# Patient Record
Sex: FEMALE | Race: WHITE | NOT HISPANIC OR LATINO | Employment: FULL TIME | URBAN - METROPOLITAN AREA
[De-identification: names, ages, dates, MRNs, and addresses within clinical notes are randomized per-mention and may not be internally consistent; named-entity substitution may affect disease eponyms.]

---

## 2017-02-06 RX ORDER — NORETHINDRONE ACETATE AND ETHINYL ESTRADIOL 1MG-20(21)
1 KIT ORAL DAILY
Qty: 28 TABLET | Refills: 5 | Status: SHIPPED | OUTPATIENT
Start: 2017-02-06 | End: 2017-02-07 | Stop reason: SDUPTHER

## 2017-02-07 RX ORDER — NORETHINDRONE ACETATE AND ETHINYL ESTRADIOL 1MG-20(21)
1 KIT ORAL DAILY
Qty: 28 TABLET | Refills: 5 | Status: SHIPPED | OUTPATIENT
Start: 2017-02-07 | End: 2017-07-04 | Stop reason: SDUPTHER

## 2017-07-05 RX ORDER — NORETHINDRONE ACETATE AND ETHINYL ESTRADIOL AND FERROUS FUMARATE 1MG-20(21)
KIT ORAL
Qty: 28 TABLET | Refills: 2 | Status: SHIPPED | OUTPATIENT
Start: 2017-07-05 | End: 2017-08-17

## 2017-08-14 ENCOUNTER — OFFICE VISIT (OUTPATIENT)
Dept: INTERNAL MEDICINE | Facility: CLINIC | Age: 22
End: 2017-08-14

## 2017-08-14 VITALS
BODY MASS INDEX: 20.16 KG/M2 | SYSTOLIC BLOOD PRESSURE: 124 MMHG | DIASTOLIC BLOOD PRESSURE: 68 MMHG | OXYGEN SATURATION: 97 % | HEART RATE: 63 BPM | TEMPERATURE: 97.4 F | RESPIRATION RATE: 16 BRPM | WEIGHT: 121 LBS | HEIGHT: 65 IN

## 2017-08-14 DIAGNOSIS — Z00.00 ENCOUNTER FOR ANNUAL PHYSICAL EXAM: Primary | ICD-10-CM

## 2017-08-14 LAB
BILIRUB BLD-MCNC: NEGATIVE MG/DL
CLARITY, POC: CLEAR
COLOR UR: YELLOW
GLUCOSE UR STRIP-MCNC: NEGATIVE MG/DL
KETONES UR QL: NEGATIVE
LEUKOCYTE EST, POC: ABNORMAL
NITRITE UR-MCNC: NEGATIVE MG/ML
PH UR: 6 [PH] (ref 5–8)
PROT UR STRIP-MCNC: ABNORMAL MG/DL
RBC # UR STRIP: NEGATIVE /UL
SP GR UR: 1.02 (ref 1–1.03)
UROBILINOGEN UR QL: NORMAL

## 2017-08-14 PROCEDURE — 99395 PREV VISIT EST AGE 18-39: CPT | Performed by: INTERNAL MEDICINE

## 2017-08-14 PROCEDURE — 81003 URINALYSIS AUTO W/O SCOPE: CPT | Performed by: INTERNAL MEDICINE

## 2017-08-14 NOTE — PROGRESS NOTES
Subjective   Serina Rodriguez is a 22 y.o. female.     Chief Complaint   Patient presents with   • Annual Exam     physical         HPI Comments: In for annual preventative exam.  Sleep is good.  Averages about 8 hours at night.  Energy is good.  Exercises daily.  Diet is well-balanced.       The following portions of the patient's history were reviewed and updated as appropriate: allergies, current medications, past social history and problem list.    HISTORY  Outpatient Prescriptions Marked as Taking for the 8/14/17 encounter (Office Visit) with Gabino Buenrostro MD   Medication Sig Dispense Refill   • folic acid (FOLVITE) 1 MG tablet Take 1 mg by mouth daily.     • JUNEL FE 1/20 1-20 MG-MCG per tablet TAKE 1 TABLET BY MOUTH DAILY. 28 tablet 2   • levETIRAcetam (KEPPRA) 750 MG tablet Take 750 mg by mouth 2 (two) times a day.       Social History     Social History   • Marital status: Single     Spouse name: N/A   • Number of children: N/A   • Years of education: N/A     Occupational History   • Not on file.     Social History Main Topics   • Smoking status: Never Smoker   • Smokeless tobacco: Not on file   • Alcohol use No   • Drug use: Not on file   • Sexual activity: Not on file     Other Topics Concern   • Not on file     Social History Narrative     Family History   Problem Relation Age of Onset   • No Known Problems Mother    • No Known Problems Father    • No Known Problems Sister    • Breast cancer Maternal Grandmother    • Lung cancer Maternal Grandmother    • Cervical cancer Maternal Aunt      Past Medical History:   Diagnosis Date   • Asthma    • IBS (irritable bowel syndrome)    • Iron deficiency anemia    • Irregular menses    • Medication management    • Migraine    • Physical exam, annual    • Seizure, partial    • Syncope, vasovagal      No past surgical history on file.    Review of Systems   Neurological: Positive for headaches.       Objective   Vitals:    08/14/17 0830   BP: 124/68   Pulse: 63    Resp: 16   Temp: 97.4 °F (36.3 °C)   SpO2: 97%      Last Weight    08/14/17  0830   Weight: 121 lb (54.9 kg)    [unfilled]  Body mass index is 20.14 kg/(m^2).      Physical Exam   Constitutional: She is oriented to person, place, and time. She appears well-developed and well-nourished.   HENT:   Head: Normocephalic and atraumatic.   Right Ear: External ear normal.   Left Ear: External ear normal.   Nose: Nose normal.   Mouth/Throat: Oropharynx is clear and moist.   Eyes: Conjunctivae and EOM are normal. Pupils are equal, round, and reactive to light.   Neck: Normal range of motion. Neck supple. No JVD present. No thyromegaly present.   Cardiovascular: Normal rate, regular rhythm, normal heart sounds and intact distal pulses.  Exam reveals no gallop.    No murmur heard.  Pulmonary/Chest: Effort normal and breath sounds normal. No respiratory distress. She has no wheezes. She has no rales.   Abdominal: Soft. Bowel sounds are normal. She exhibits no distension and no mass. There is no tenderness. There is no guarding. No hernia.   Musculoskeletal: Normal range of motion. She exhibits no edema.   Lymphadenopathy:     She has no cervical adenopathy.   Neurological: She is alert and oriented to person, place, and time. She displays normal reflexes. No cranial nerve deficit. Coordination normal.   Skin: Skin is warm and dry.   Psychiatric: She has a normal mood and affect. Her behavior is normal. Judgment and thought content normal.   Nursing note and vitals reviewed.        Problem List Items Addressed This Visit     None      Visit Diagnoses     Encounter for annual physical exam    -  Primary    Relevant Orders    POCT urinalysis dipstick, automated (Completed)        Assessment/Plan   In for annual preventative exam.  Done with college.  Working.  Doing IT consulting.  IBS in high school.  Not much of a problem anymore at all.  She's had a history of migraines but she rarely gets them.  She had one spell of  asthma 7 years ago, probably asthmatic bronchitis.  No further problems there.  She due for lab work today including CBC, CMP, lipids, UA.  We need to get her immunization records, especially in regards to tetanus immunization.  He is almost certainly up-to-date.         Dragon disclaimer:   Much of this encounter note is an electronic transcription/translation of spoken language to printed text. The electronic translation of spoken language may permit erroneous, or at times, nonsensical words or phrases to be inadvertently transcribed; Although I have reviewed the note for such errors, some may still exist.

## 2017-08-15 LAB
ALBUMIN SERPL-MCNC: 4.6 G/DL (ref 3.5–5.2)
ALBUMIN/GLOB SERPL: 1.8 G/DL
ALP SERPL-CCNC: 66 U/L (ref 39–117)
ALT SERPL-CCNC: 11 U/L (ref 1–33)
AST SERPL-CCNC: 16 U/L (ref 1–32)
BASOPHILS # BLD AUTO: 0.02 10*3/MM3 (ref 0–0.2)
BASOPHILS NFR BLD AUTO: 0.3 % (ref 0–1.5)
BILIRUB SERPL-MCNC: 0.6 MG/DL (ref 0.1–1.2)
BUN SERPL-MCNC: 14 MG/DL (ref 6–20)
BUN/CREAT SERPL: 17.9 (ref 7–25)
CALCIUM SERPL-MCNC: 10 MG/DL (ref 8.6–10.5)
CHLORIDE SERPL-SCNC: 100 MMOL/L (ref 98–107)
CHOLEST SERPL-MCNC: 227 MG/DL (ref 0–200)
CO2 SERPL-SCNC: 26.7 MMOL/L (ref 22–29)
CREAT SERPL-MCNC: 0.78 MG/DL (ref 0.57–1)
EOSINOPHIL # BLD AUTO: 0.29 10*3/MM3 (ref 0–0.7)
EOSINOPHIL NFR BLD AUTO: 4.9 % (ref 0.3–6.2)
ERYTHROCYTE [DISTWIDTH] IN BLOOD BY AUTOMATED COUNT: 12.9 % (ref 11.7–13)
GLOBULIN SER CALC-MCNC: 2.5 GM/DL
GLUCOSE SERPL-MCNC: 82 MG/DL (ref 65–99)
HCT VFR BLD AUTO: 46.9 % (ref 35.6–45.5)
HDLC SERPL-MCNC: 71 MG/DL (ref 40–60)
HGB BLD-MCNC: 14.7 G/DL (ref 11.9–15.5)
IMM GRANULOCYTES # BLD: 0 10*3/MM3 (ref 0–0.03)
IMM GRANULOCYTES NFR BLD: 0 % (ref 0–0.5)
LDLC SERPL CALC-MCNC: 137 MG/DL (ref 0–100)
LYMPHOCYTES # BLD AUTO: 1.89 10*3/MM3 (ref 0.9–4.8)
LYMPHOCYTES NFR BLD AUTO: 32.2 % (ref 19.6–45.3)
MCH RBC QN AUTO: 31 PG (ref 26.9–32)
MCHC RBC AUTO-ENTMCNC: 31.3 G/DL (ref 32.4–36.3)
MCV RBC AUTO: 98.9 FL (ref 80.5–98.2)
MONOCYTES # BLD AUTO: 0.5 10*3/MM3 (ref 0.2–1.2)
MONOCYTES NFR BLD AUTO: 8.5 % (ref 5–12)
NEUTROPHILS # BLD AUTO: 3.17 10*3/MM3 (ref 1.9–8.1)
NEUTROPHILS NFR BLD AUTO: 54.1 % (ref 42.7–76)
PLATELET # BLD AUTO: 216 10*3/MM3 (ref 140–500)
POTASSIUM SERPL-SCNC: 4.2 MMOL/L (ref 3.5–5.2)
PROT SERPL-MCNC: 7.1 G/DL (ref 6–8.5)
RBC # BLD AUTO: 4.74 10*6/MM3 (ref 3.9–5.2)
SODIUM SERPL-SCNC: 141 MMOL/L (ref 136–145)
TRIGL SERPL-MCNC: 97 MG/DL (ref 0–150)
VLDLC SERPL CALC-MCNC: 19.4 MG/DL (ref 5–40)
WBC # BLD AUTO: 5.87 10*3/MM3 (ref 4.5–10.7)

## 2017-08-17 ENCOUNTER — OFFICE VISIT (OUTPATIENT)
Dept: OBSTETRICS AND GYNECOLOGY | Age: 22
End: 2017-08-17

## 2017-08-17 VITALS
WEIGHT: 119 LBS | DIASTOLIC BLOOD PRESSURE: 74 MMHG | BODY MASS INDEX: 19.83 KG/M2 | SYSTOLIC BLOOD PRESSURE: 116 MMHG | HEIGHT: 65 IN

## 2017-08-17 DIAGNOSIS — Z12.4 ROUTINE CERVICAL SMEAR: ICD-10-CM

## 2017-08-17 DIAGNOSIS — Z11.3 SCREEN FOR SEXUALLY TRANSMITTED DISEASES: ICD-10-CM

## 2017-08-17 DIAGNOSIS — Z01.419 ENCOUNTER FOR GYNECOLOGICAL EXAMINATION WITHOUT ABNORMAL FINDING: Primary | ICD-10-CM

## 2017-08-17 PROCEDURE — 99395 PREV VISIT EST AGE 18-39: CPT | Performed by: OBSTETRICS & GYNECOLOGY

## 2017-08-17 RX ORDER — NORETHINDRONE ACETATE AND ETHINYL ESTRADIOL 1MG-20(21)
1 KIT ORAL DAILY
Qty: 28 TABLET | Refills: 12 | Status: SHIPPED | OUTPATIENT
Start: 2017-08-17 | End: 2018-06-30 | Stop reason: SDUPTHER

## 2017-08-17 RX ORDER — NORETHINDRONE ACETATE AND ETHINYL ESTRADIOL 1MG-20(21)
1 KIT ORAL DAILY
Qty: 28 TABLET | Refills: 11 | Status: SHIPPED | OUTPATIENT
Start: 2017-08-17 | End: 2017-08-17 | Stop reason: ALTCHOICE

## 2017-08-17 RX ORDER — NORETHINDRONE ACETATE AND ETHINYL ESTRADIOL 1MG-20(21)
1 KIT ORAL DAILY
COMMUNITY
End: 2017-08-17 | Stop reason: SDUPTHER

## 2017-08-17 NOTE — PROGRESS NOTES
Subjective     Chief Complaint   Patient presents with   • Gynecologic Exam     AC, was Dr Rangel pt       History of Present Illness    Serina Rodriguez is a 22 y.o.  who presents for annual exam. The patient has previously seen Dr. Rangel.    The patient attended [employer] and then went on to the [employer] to study economics.  She is now working here in [place].  She has a history of irregular cycles that have corrected with oral contraceptive pills.  She is not sexually active.  She does have a history of Seizure Disorder for which she Is on Keppra.      Does have a family history of breast and colon cancer.  She will require early screening but her family members have been checked for genetic mutations and have been negative.  Her menses are regular every 28-30 days, lasting 4-7 days, dysmenorrhea none   Obstetric History:  OB History      Para Term  AB TAB SAB Ectopic Multiple Living    0 0 0 0 0 0 0 0 0 0         Menstrual History:     Patient's last menstrual period was 2017.         Current contraception: abstinence  History of abnormal Pap smear: no  Received Gardasil immunization: all 3  Perform regular self breast exam: no  Family history of uterine or ovarian cancer: no  Family History of colon cancer: pat uncle 45, he is Spencer neg  Family history of breast cancer: yes - MGM 48  pt's mom is BRCA neg    Mammogram: not indicated. Start at 38  Colonoscopy: not indicated. Start at 35  DEXA: not indicated.    Exercise: very active Runner  Calcium/Vitamin D: adequate intake    The following portions of the patient's history were reviewed and updated as appropriate: allergies, current medications, past family history, past medical history, past social history, past surgical history and problem list.    Review of Systems    Review of Systems   Constitutional: Negative for fatigue.   Respiratory: Negative for shortness of breath.    Gastrointestinal:  "Negative for abdominal pain.   Genitourinary: Negative for dysuria.   Neurological: Negative for headaches.   Psychiatric/Behavioral: Negative for dysphoric mood.         Objective   Physical Exam    /74  Ht 65\" (165.1 cm)  Wt 119 lb (54 kg)  LMP 08/07/2017  BMI 19.8 kg/m2    General:   alert, appears stated age and cooperative   Neck: thyroid normal to palpation   Heart: regular rate and rhythm   Lungs: clear to auscultation bilaterally   Abdomen: soft, non-tender, without masses or organomegaly   Breast: inspection negative, no nipple discharge or bleeding, no masses or nodularity palpable   Vulva: normal, Bartholin's, Urethra, Butte's normal   Vagina: normal mucosa, normal discharge   Cervix: no cervical motion tenderness and no lesions   Uterus: non-tender, normal shape and consistency   Adnexa: no mass, fullness, tenderness   Rectal: not indicated     Assessment/Plan   Serina was seen today for gynecologic exam.    Diagnoses and all orders for this visit:    Encounter for gynecological examination without abnormal finding    Other orders  -     norethindrone-ethinyl estradiol FE (BLISOVI FE 1/20) 1-20 MG-MCG per tablet; Take 1 tablet by mouth Daily.    We did discuss that oral contraceptive pills can be affected by her Keppra.  She is not currently sexually active.  If she becomes sexually active and would recommend the addition of condoms or consideration of an IUD.    All questions answered.  Breast self exam technique reviewed and patient encouraged to perform self-exam monthly.  Discussed healthy lifestyle modifications.  Recommended 30 minutes of aerobic exercise five times per week.  Discussed calcium needs to prevent osteoporosis.                 "

## 2017-08-22 ENCOUNTER — TELEPHONE (OUTPATIENT)
Dept: OBSTETRICS AND GYNECOLOGY | Age: 22
End: 2017-08-22

## 2017-08-22 LAB
C TRACH RRNA CVX QL NAA+PROBE: NEGATIVE
CONV .: NORMAL
CYTOLOGIST CVX/VAG CYTO: NORMAL
CYTOLOGY CVX/VAG DOC THIN PREP: NORMAL
DX ICD CODE: NORMAL
HIV 1 & 2 AB SER-IMP: NORMAL
N GONORRHOEA RRNA CVX QL NAA+PROBE: NEGATIVE
OTHER STN SPEC: NORMAL
PATH REPORT.FINAL DX SPEC: NORMAL
STAT OF ADQ CVX/VAG CYTO-IMP: NORMAL

## 2018-07-02 RX ORDER — NORETHINDRONE ACETATE AND ETHINYL ESTRADIOL AND FERROUS FUMARATE 1MG-20(21)
KIT ORAL
Qty: 28 TABLET | Refills: 0 | Status: SHIPPED | OUTPATIENT
Start: 2018-07-02 | End: 2018-07-23 | Stop reason: SDUPTHER

## 2018-07-23 RX ORDER — NORETHINDRONE ACETATE AND ETHINYL ESTRADIOL AND FERROUS FUMARATE 1MG-20(21)
KIT ORAL
Qty: 28 TABLET | Refills: 0 | Status: SHIPPED | OUTPATIENT
Start: 2018-07-23 | End: 2019-10-01 | Stop reason: SDUPTHER

## 2018-08-27 RX ORDER — NORETHINDRONE ACETATE AND ETHINYL ESTRADIOL AND FERROUS FUMARATE 1MG-20(21)
KIT ORAL
Qty: 28 TABLET | Refills: 0 | OUTPATIENT
Start: 2018-08-27

## 2018-08-29 ENCOUNTER — TELEPHONE (OUTPATIENT)
Dept: OBSTETRICS AND GYNECOLOGY | Age: 23
End: 2018-08-29

## 2018-08-29 RX ORDER — NORETHINDRONE ACETATE AND ETHINYL ESTRADIOL 1MG-20(21)
1 KIT ORAL DAILY
Qty: 28 TABLET | Refills: 12 | Status: SHIPPED | OUTPATIENT
Start: 2018-08-29 | End: 2019-08-29

## 2018-08-29 NOTE — TELEPHONE ENCOUNTER
Patient is requesting 1 refill of her Birth control. [place] an exam down there but will need 1 week worth of pills.

## 2019-10-01 ENCOUNTER — OFFICE VISIT (OUTPATIENT)
Dept: OBSTETRICS AND GYNECOLOGY | Age: 24
End: 2019-10-01

## 2019-10-01 VITALS
SYSTOLIC BLOOD PRESSURE: 110 MMHG | BODY MASS INDEX: 20.32 KG/M2 | DIASTOLIC BLOOD PRESSURE: 64 MMHG | HEIGHT: 64 IN | WEIGHT: 119 LBS

## 2019-10-01 DIAGNOSIS — Z01.419 WELL WOMAN EXAM WITH ROUTINE GYNECOLOGICAL EXAM: Primary | ICD-10-CM

## 2019-10-01 PROCEDURE — 99395 PREV VISIT EST AGE 18-39: CPT | Performed by: PHYSICIAN ASSISTANT

## 2019-10-01 RX ORDER — NORETHINDRONE ACETATE AND ETHINYL ESTRADIOL 1MG-20(21)
1 KIT ORAL DAILY
Qty: 28 TABLET | Refills: 11 | Status: SHIPPED | OUTPATIENT
Start: 2019-10-01 | End: 2020-10-06 | Stop reason: SDUPTHER

## 2020-10-06 ENCOUNTER — OFFICE VISIT (OUTPATIENT)
Dept: OBSTETRICS AND GYNECOLOGY | Age: 25
End: 2020-10-06

## 2020-10-06 VITALS
DIASTOLIC BLOOD PRESSURE: 64 MMHG | SYSTOLIC BLOOD PRESSURE: 104 MMHG | HEIGHT: 65 IN | WEIGHT: 121 LBS | BODY MASS INDEX: 20.16 KG/M2

## 2020-10-06 DIAGNOSIS — Z01.419 WELL WOMAN EXAM WITH ROUTINE GYNECOLOGICAL EXAM: Primary | ICD-10-CM

## 2020-10-06 PROCEDURE — 99395 PREV VISIT EST AGE 18-39: CPT | Performed by: PHYSICIAN ASSISTANT

## 2020-10-06 RX ORDER — NORETHINDRONE ACETATE AND ETHINYL ESTRADIOL 1MG-20(21)
1 KIT ORAL DAILY
Qty: 90 TABLET | Refills: 3 | Status: SHIPPED | OUTPATIENT
Start: 2020-10-06 | End: 2020-10-20

## 2020-10-06 NOTE — PROGRESS NOTES
Subjective     Chief Complaint   Patient presents with   • Gynecologic Exam     pap 2017 neg, no hpv       History of Present Illness    Serina Rodriguez is a 25 y.o.  who presents for annual exam.    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         Current contraception: OCP (estrogen/progesterone)  History of abnormal Pap smear: no  Received Gardasil immunization: yes  Perform regular self breast exam: yes - occl  Family history of uterine or ovarian cancer: no  Family History of colon cancer: no  Family history of breast cancer: yes - MGM     Mammogram: not indicated.  Colonoscopy: not indicated.  DEXA: not indicated.    Exercise: moderately active  Calcium/Vitamin D: adequate intake    The following portions of the patient's history were reviewed and updated as appropriate: allergies, current medications, past family history, past medical history, past social history, past surgical history and problem list.    Review of Systems    Review of Systems   Constitutional: Negative for fatigue.   Respiratory: Negative for shortness of breath.    Gastrointestinal: Negative for abdominal pain.   Genitourinary: Negative for dysuria.   Neurological: Negative for headaches.   Psychiatric/Behavioral: Negative for dysphoric mood.         Objective   Physical  "Exam    /64   Ht 165.1 cm (65\")   Wt 54.9 kg (121 lb)   LMP 09/28/2020 (Exact Date)   Breastfeeding No   BMI 20.14 kg/m²   General:   alert, comfortable and no distress   Heart: regular rate and rhythm   Lungs: clear to auscultation bilaterally   Breast: normal appearance, no masses or tenderness, Inspection negative, No nipple retraction or dimpling, No nipple discharge or bleeding, No axillary or supraclavicular adenopathy, Normal to palpation without dominant masses   Neck: no adenopathy and no carotid bruit   Abdomen: {normal findings: bowel sounds normal   CVA: Not performed today   Pelvis: External genitalia: normal general appearance  Vaginal: normal mucosa without prolapse or lesions  Cervix: normal appearance and thin prep PAP obtained  Adnexa: normal bimanual exam  Uterus: normal single, nontender   Extremities: Not performed today   Neurologic: negative   Psychiatric: Normal affect, judgement, and mood     Assessment/Plan   Serina was seen today for gynecologic exam.    Diagnoses and all orders for this visit:    Well woman exam with routine gynecological exam  -     Pap IG, Rfx HPV ASCU    Other orders  -     norethindrone-ethinyl estradiol FE (Junel FE 1/20) 1-20 MG-MCG per tablet; Take 1 tablet by mouth Daily.        All questions answered.  Breast self exam technique reviewed and patient encouraged to perform self-exam monthly.  Discussed healthy lifestyle modifications.  Recommended 30 minutes of aerobic exercise five times per week.  Discussed calcium needs to prevent osteoporosis.    Pap done today, declines std testing  Refill sent in for BCP  Will wait approx 3 years prior to pregnancy               "

## 2020-10-14 LAB
CONV .: NORMAL
CYTOLOGIST CVX/VAG CYTO: NORMAL
CYTOLOGY CVX/VAG DOC CYTO: NORMAL
CYTOLOGY CVX/VAG DOC THIN PREP: NORMAL
DX ICD CODE: NORMAL
HIV 1 & 2 AB SER-IMP: NORMAL
OTHER STN SPEC: NORMAL
PATHOLOGIST CVX/VAG CYTO: NORMAL
STAT OF ADQ CVX/VAG CYTO-IMP: NORMAL

## 2020-10-20 RX ORDER — NORETHINDRONE ACETATE AND ETHINYL ESTRADIOL AND FERROUS FUMARATE 1MG-20(21)
KIT ORAL
Qty: 90 TABLET | Refills: 3 | Status: SHIPPED | OUTPATIENT
Start: 2020-10-20 | End: 2021-09-20

## 2020-12-22 ENCOUNTER — TELEMEDICINE (OUTPATIENT)
Dept: INTERNAL MEDICINE | Facility: CLINIC | Age: 25
End: 2020-12-22

## 2020-12-22 DIAGNOSIS — M19.90 ARTHRITIS: Primary | ICD-10-CM

## 2020-12-22 PROCEDURE — 99213 OFFICE O/P EST LOW 20 MIN: CPT | Performed by: INTERNAL MEDICINE

## 2020-12-22 NOTE — PROGRESS NOTES
Subjective   Serina Rodriguez is a 25 y.o. female.     Chief Complaint   Patient presents with   • Joint Swelling     Knuckle edema x 10 days   • Hand Pain         Joint Swelling  This is a new problem. The current episode started 1 to 4 weeks ago. The problem occurs constantly. The problem has been unchanged. Associated symptoms include joint swelling. Pertinent negatives include no arthralgias, chills, fever, rash or urinary symptoms. Nothing aggravates the symptoms.        The following portions of the patient's history were reviewed and updated as appropriate: allergies, current medications, past social history and problem list.    Outpatient Medications Marked as Taking for the 12/22/20 encounter (Telemedicine) with Gabino Buenrostro MD   Medication Sig Dispense Refill   • folic acid (FOLVITE) 1 MG tablet Take 1 mg by mouth daily.     • Junel FE 1/20 1-20 MG-MCG per tablet TAKE 1 TABLET BY MOUTH DAILY 90 tablet 3   • levETIRAcetam (KEPPRA) 750 MG tablet Take 750 mg by mouth 2 (two) times a day.         Review of Systems   Constitutional: Negative for chills and fever.   Musculoskeletal: Positive for joint swelling. Negative for arthralgias.   Skin: Negative for rash.       Objective   There were no vitals filed for this visit.   Wt Readings from Last 3 Encounters:   10/06/20 54.9 kg (121 lb)   10/01/19 54 kg (119 lb)   08/17/17 54 kg (119 lb)    There is no height or weight on file to calculate BMI.      Physical Exam  Constitutional:       Appearance: Normal appearance.   Pulmonary:      Effort: Pulmonary effort is normal.   Musculoskeletal:         General: Swelling present.   Neurological:      Mental Status: She is alert.   Psychiatric:         Mood and Affect: Mood normal.         Behavior: Behavior normal.         Thought Content: Thought content normal.         Judgment: Judgment normal.           Problems Addressed this Visit     None      Visit Diagnoses     Arthritis    -  Primary      Diagnoses        Codes Comments    Arthritis    -  Primary ICD-10-CM: M19.90  ICD-9-CM: 716.90         Assessment/Plan   Video visit today due to Covid pandemic.  She has had some swelling of the joints in her fingers for 13 days.  That includes the right middle finger predominantly but later the right index finger and right little finger.  Also the left ring finger is now involved.  It is all the PIPs.  Each of those PIPs is swollen and this is visible on the video camera today.  She has no pain.  No other joint involvement.  No pleurisy or pericarditis or rash or photosensitivity.  No Raynaud's.  No hair loss.  The etiology of this synovitis is not clear.  It is pauciarticular at this point.  We will simply observe for now.  Try to give it 2 months total.  Neck step will be some connective tissue screening tests.  She just had some hand x-rays at her chiropractor's office this week which were apparently lower normal.  No camping.  No travel.  We will keep in mind parvovirus testing and Lyme testing and so forth is a second order set of test.             Dragon disclaimer:   Much of this encounter note is an electronic transcription/translation of spoken language to printed text. The electronic translation of spoken language may permit erroneous, or at times, nonsensical words or phrases to be inadvertently transcribed; Although I have reviewed the note for such errors, some may still exist.

## 2021-09-20 RX ORDER — NORETHINDRONE ACETATE AND ETHINYL ESTRADIOL AND FERROUS FUMARATE 1MG-20(21)
KIT ORAL
Qty: 84 TABLET | Refills: 0 | Status: SHIPPED | OUTPATIENT
Start: 2021-09-20 | End: 2022-01-06

## 2021-10-11 ENCOUNTER — OFFICE VISIT (OUTPATIENT)
Dept: OBSTETRICS AND GYNECOLOGY | Age: 26
End: 2021-10-11

## 2021-10-11 VITALS
SYSTOLIC BLOOD PRESSURE: 106 MMHG | BODY MASS INDEX: 20.73 KG/M2 | HEIGHT: 65 IN | WEIGHT: 124.4 LBS | DIASTOLIC BLOOD PRESSURE: 62 MMHG

## 2021-10-11 DIAGNOSIS — Z01.419 WELL WOMAN EXAM WITH ROUTINE GYNECOLOGICAL EXAM: Primary | ICD-10-CM

## 2021-10-11 DIAGNOSIS — Z31.69 PRE-CONCEPTION COUNSELING: ICD-10-CM

## 2021-10-11 PROCEDURE — 99395 PREV VISIT EST AGE 18-39: CPT | Performed by: PHYSICIAN ASSISTANT

## 2021-10-11 RX ORDER — MULTIPLE VITAMINS W/ MINERALS TAB 9MG-400MCG
1 TAB ORAL DAILY
COMMUNITY
End: 2022-01-06

## 2021-10-11 NOTE — PROGRESS NOTES
"Subjective     Chief Complaint   Patient presents with   • Gynecologic Exam     annual. last pap 10/6/20 (neg). Pt has no complaints        History of Present Illness    Serina Graham is a 26 y.o.  who presents for annual exam.    She is doing well  Did get  last year and is doing well  Plans to change her first name to her middle name which is what she usually goes by, \"Elda\"    She did get to go to [facility]  Bit of an issue with covid testing but worked out ok  Going to [place] for their year anniversary    On BCP now and happy with them  Is thinking children sooner then later  Disc rubella status and carrier testing  She will do rubella today and review carrier testing     utd on paps  Her menses are regular every 28-30 days, lasting 4-7 days, dysmenorrhea none   Obstetric History:  OB History        0    Para   0    Term   0       0    AB   0    Living   0       SAB   0    IAB   0    Ectopic   0    Molar        Multiple   0    Live Births                   Menstrual History:     Patient's last menstrual period was 2021 (approximate).         Current contraception: OCP (estrogen/progesterone)  History of abnormal Pap smear: no  Received Gardasil immunization: yes  Perform regular self breast exam: yes - occl  Family history of uterine or ovarian cancer: no  Family History of colon cancer: no  Family history of breast cancer: no    Mammogram: not indicated.  Colonoscopy: not indicated.  DEXA: not indicated.    Exercise: moderately active  Calcium/Vitamin D: adequate intake    The following portions of the patient's history were reviewed and updated as appropriate: allergies, current medications, past family history, past medical history, past social history, past surgical history and problem list.    Review of Systems   All other systems reviewed and are negative.       Review of Systems   Constitutional: Negative for fatigue.   Respiratory: Negative for shortness of breath. " "   Gastrointestinal: Negative for abdominal pain.   Genitourinary: Negative for dysuria.   Neurological: Negative for headaches.   Psychiatric/Behavioral: Negative for dysphoric mood.         Objective   Physical Exam    /62   Ht 165.1 cm (65\")   Wt 56.4 kg (124 lb 6.4 oz)   LMP 09/27/2021 (Approximate)   Breastfeeding No   BMI 20.70 kg/m²   General:   alert, comfortable and no distress   Heart: regular rate and rhythm   Lungs: clear to auscultation bilaterally   Breast: normal appearance, no masses or tenderness, Inspection negative, No nipple retraction or dimpling, No nipple discharge or bleeding, No axillary or supraclavicular adenopathy, Normal to palpation without dominant masses   Neck: no adenopathy and no carotid bruit   Abdomen: {normal findings: soft, non-tender   CVA: Not performed today   Pelvis: External genitalia: normal general appearance  Vaginal: normal mucosa without prolapse or lesions  Cervix: normal appearance  Adnexa: normal bimanual exam  Uterus: normal single, nontender   Extremities: Not performed today   Neurologic: negative   Psychiatric: Normal affect, judgement, and mood     Assessment/Plan   Diagnoses and all orders for this visit:    1. Well woman exam with routine gynecological exam (Primary)    2. Pre-conception counseling  -     Rubella Antibody, IgG        All questions answered.  Breast self exam technique reviewed and patient encouraged to perform self-exam monthly.  Discussed healthy lifestyle modifications.  Recommended 30 minutes of aerobic exercise five times per week.  Discussed calcium needs to prevent osteoporosis.    Pap utd  Rubella testing today  Disc carrier testing  Start PNV                "

## 2021-10-12 LAB — RUBV IGG SERPL IA-ACNC: 1 INDEX

## 2022-01-06 ENCOUNTER — INITIAL PRENATAL (OUTPATIENT)
Dept: OBSTETRICS AND GYNECOLOGY | Age: 27
End: 2022-01-06

## 2022-01-06 VITALS — DIASTOLIC BLOOD PRESSURE: 76 MMHG | WEIGHT: 129 LBS | SYSTOLIC BLOOD PRESSURE: 108 MMHG | BODY MASS INDEX: 21.47 KG/M2

## 2022-01-06 DIAGNOSIS — O36.80X0 PREGNANCY WITH INCONCLUSIVE FETAL VIABILITY: ICD-10-CM

## 2022-01-06 DIAGNOSIS — Z11.3 SCREENING EXAMINATION FOR VENEREAL DISEASE: ICD-10-CM

## 2022-01-06 DIAGNOSIS — Z13.89 SCREENING FOR BLOOD OR PROTEIN IN URINE: Primary | ICD-10-CM

## 2022-01-06 DIAGNOSIS — Z82.79 FAMILY HISTORY OF CONGENITAL HEART DISEASE IN SISTER: ICD-10-CM

## 2022-01-06 DIAGNOSIS — Z34.00 SUPERVISION OF NORMAL FIRST PREGNANCY, ANTEPARTUM: ICD-10-CM

## 2022-01-06 LAB
GLUCOSE UR STRIP-MCNC: NEGATIVE MG/DL
PROT UR STRIP-MCNC: NEGATIVE MG/DL
VZV IGG SER QL: NORMAL

## 2022-01-06 PROCEDURE — 0501F PRENATAL FLOW SHEET: CPT | Performed by: OBSTETRICS & GYNECOLOGY

## 2022-01-06 PROCEDURE — 76817 TRANSVAGINAL US OBSTETRIC: CPT | Performed by: OBSTETRICS & GYNECOLOGY

## 2022-01-06 NOTE — PROGRESS NOTES
The patient is a 26-year-old  1 para 0 at 10 weeks 1 day by sure LMP consistent within 4 days by ultrasound today.  Patient does have significant history of a seizure disorder however she has not had a seizure since  and she has been off Keppra for many years.  She has seen a neurologist through [facility].  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  Patient works in [employer].  She is here today with her  Franc.  He is from [place].  He himself had clubfeet.  The patient's sister had a VSD and had to have open heart surgery.  Full history including genetic history is reviewed    Exam-see exam tab    Ultrasound shows viable intrauterine pregnancy.  Dates agree within 4 days.    Assessment- 10 weeks  History of seizure disorder with no seizures for the past approximately 6 to 7 years.  Patient has followed with neurology and has been taken off medication.  She does have a history of absence seizure's.  She was previously on Keppra.  Discussed options for genetic testing-patient declines amniocentesis but would like to do cell free DNA and carrier testing.  Discussed the testing in detail  Patient has a history of having seizures after flu vaccinations.  She declines flu and COVID vaccinations because of this reason.  Family history of VSD and her sister that required open heart surgery at age 4.  Plan to check fetal echo.  We discussed new OB information in detail.  Follow-up in 4 weeks.   had club feet.  We will try to look for this on ultrasound.

## 2022-01-08 LAB
ABO GROUP BLD: ABNORMAL
BACTERIA UR CULT: NO GROWTH
BACTERIA UR CULT: NORMAL
BASOPHILS # BLD AUTO: 0 X10E3/UL (ref 0–0.2)
BASOPHILS NFR BLD AUTO: 0 %
BLD GP AB SCN SERPL QL: NEGATIVE
EOSINOPHIL # BLD AUTO: 0.1 X10E3/UL (ref 0–0.4)
EOSINOPHIL NFR BLD AUTO: 1 %
ERYTHROCYTE [DISTWIDTH] IN BLOOD BY AUTOMATED COUNT: 12.6 % (ref 11.7–15.4)
HBV SURFACE AG SERPL QL IA: NEGATIVE
HCT VFR BLD AUTO: 40.5 % (ref 34–46.6)
HCV AB S/CO SERPL IA: 0.1 S/CO RATIO (ref 0–0.9)
HGB BLD-MCNC: 13.9 G/DL (ref 11.1–15.9)
HIV 1+2 AB+HIV1 P24 AG SERPL QL IA: NON REACTIVE
IMM GRANULOCYTES # BLD AUTO: 0 X10E3/UL (ref 0–0.1)
IMM GRANULOCYTES NFR BLD AUTO: 0 %
LYMPHOCYTES # BLD AUTO: 1.7 X10E3/UL (ref 0.7–3.1)
LYMPHOCYTES NFR BLD AUTO: 18 %
MCH RBC QN AUTO: 31.3 PG (ref 26.6–33)
MCHC RBC AUTO-ENTMCNC: 34.3 G/DL (ref 31.5–35.7)
MCV RBC AUTO: 91 FL (ref 79–97)
MONOCYTES # BLD AUTO: 0.8 X10E3/UL (ref 0.1–0.9)
MONOCYTES NFR BLD AUTO: 8 %
NEUTROPHILS # BLD AUTO: 7 X10E3/UL (ref 1.4–7)
NEUTROPHILS NFR BLD AUTO: 73 %
PLATELET # BLD AUTO: 245 X10E3/UL (ref 150–450)
RBC # BLD AUTO: 4.44 X10E6/UL (ref 3.77–5.28)
RH BLD: NEGATIVE
RPR SER QL: NON REACTIVE
RUBV IGG SERPL IA-ACNC: 0.93 INDEX
WBC # BLD AUTO: 9.7 X10E3/UL (ref 3.4–10.8)

## 2022-01-09 PROBLEM — O09.899 RUBELLA NON-IMMUNE STATUS, ANTEPARTUM: Status: ACTIVE | Noted: 2022-01-09

## 2022-01-09 PROBLEM — Z67.91 RH NEGATIVE STATUS DURING PREGNANCY: Status: ACTIVE | Noted: 2022-01-09

## 2022-01-09 PROBLEM — O26.899 RH NEGATIVE STATUS DURING PREGNANCY: Status: ACTIVE | Noted: 2022-01-09

## 2022-01-09 PROBLEM — Z28.39 RUBELLA NON-IMMUNE STATUS, ANTEPARTUM: Status: ACTIVE | Noted: 2022-01-09

## 2022-01-11 LAB
C TRACH RRNA SPEC QL NAA+PROBE: NEGATIVE
N GONORRHOEA RRNA SPEC QL NAA+PROBE: NEGATIVE

## 2022-01-22 PROBLEM — Z14.1 CYSTIC FIBROSIS CARRIER: Status: ACTIVE | Noted: 2022-01-22

## 2022-01-24 ENCOUNTER — ROUTINE PRENATAL (OUTPATIENT)
Dept: OBSTETRICS AND GYNECOLOGY | Age: 27
End: 2022-01-24

## 2022-01-24 VITALS — DIASTOLIC BLOOD PRESSURE: 74 MMHG | WEIGHT: 132 LBS | SYSTOLIC BLOOD PRESSURE: 120 MMHG | BODY MASS INDEX: 21.97 KG/M2

## 2022-01-24 DIAGNOSIS — O28.5 ABNORMAL GENETIC TEST DURING PREGNANCY: Primary | ICD-10-CM

## 2022-01-24 PROCEDURE — 0502F SUBSEQUENT PRENATAL CARE: CPT | Performed by: OBSTETRICS & GYNECOLOGY

## 2022-01-24 NOTE — PROGRESS NOTES
I asked the patient to come to the office today because her cell free DNA shows high risk for trisomy 18.  Chance is 91 and 100.  Patient is not having any vaginal bleeding.  The patient's  is at work and not answering his phone.  Patient is here today with her father.    Assessment-12 weeks 5 days with possible trisomy 18  We discussed trisomy 18.  We discussed that cell free DNA testing is a screening test and definitive testing with amniocentesis or [facility] would need to be done for diagnosis.  Ultrasound was offered here today but patient would like to wait till her  is able to be here  Patient is scheduled with [facility] tomorrow for ultrasound and consultation.  Asked the patient to call back with any questions.  She does have an appointment with me next week.

## 2022-01-25 ENCOUNTER — HOSPITAL ENCOUNTER (OUTPATIENT)
Dept: ULTRASOUND IMAGING | Facility: HOSPITAL | Age: 27
Discharge: HOME | End: 2022-01-25
Admitting: OBSTETRICS & GYNECOLOGY

## 2022-01-25 ENCOUNTER — OFFICE VISIT (OUTPATIENT)
Dept: OBSTETRICS AND GYNECOLOGY | Facility: CLINIC | Age: 27
End: 2022-01-25

## 2022-01-25 VITALS
TEMPERATURE: 99.1 F | BODY MASS INDEX: 22.16 KG/M2 | HEIGHT: 65 IN | SYSTOLIC BLOOD PRESSURE: 130 MMHG | HEART RATE: 75 BPM | DIASTOLIC BLOOD PRESSURE: 72 MMHG | WEIGHT: 133 LBS

## 2022-01-25 DIAGNOSIS — O35.10X0 SUSPECTED CHROMOSOME ANOMALY OF FETUS AFFECTING MANAGEMENT OF MOTHER, ANTEPARTUM, SINGLE OR UNSPECIFIED FETUS: ICD-10-CM

## 2022-01-25 DIAGNOSIS — O09.899 RUBELLA NON-IMMUNE STATUS, ANTEPARTUM: ICD-10-CM

## 2022-01-25 DIAGNOSIS — Z82.79 FAMILY HISTORY OF CONGENITAL HEART DISEASE IN SISTER: ICD-10-CM

## 2022-01-25 DIAGNOSIS — Z67.91 RH NEGATIVE STATUS DURING PREGNANCY IN FIRST TRIMESTER: ICD-10-CM

## 2022-01-25 DIAGNOSIS — Z14.1 CYSTIC FIBROSIS CARRIER: ICD-10-CM

## 2022-01-25 DIAGNOSIS — O28.5 ABNORMAL GENETIC TEST DURING PREGNANCY: ICD-10-CM

## 2022-01-25 DIAGNOSIS — O35.2XX0 HEREDITARY DISEASE IN FAMILY POSSIBLY AFFECTING FETUS, AFFECTING MANAGEMENT OF MOTHER IN PREGNANCY, SINGLE OR UNSPECIFIED FETUS: ICD-10-CM

## 2022-01-25 DIAGNOSIS — O26.891 RH NEGATIVE STATUS DURING PREGNANCY IN FIRST TRIMESTER: ICD-10-CM

## 2022-01-25 DIAGNOSIS — Z28.39 RUBELLA NON-IMMUNE STATUS, ANTEPARTUM: ICD-10-CM

## 2022-01-25 DIAGNOSIS — O28.5 ABNORMAL GENETIC TEST DURING PREGNANCY: Primary | ICD-10-CM

## 2022-01-25 PROCEDURE — 76801 OB US < 14 WKS SINGLE FETUS: CPT | Performed by: OBSTETRICS & GYNECOLOGY

## 2022-01-25 PROCEDURE — 99215 OFFICE O/P EST HI 40 MIN: CPT | Performed by: OBSTETRICS & GYNECOLOGY

## 2022-01-25 PROCEDURE — 76801 OB US < 14 WKS SINGLE FETUS: CPT

## 2022-01-25 PROCEDURE — 99417 PROLNG OP E/M EACH 15 MIN: CPT | Performed by: OBSTETRICS & GYNECOLOGY

## 2022-01-25 NOTE — PROGRESS NOTES
Pt reports that she is doing well and denies vaginal bleeding, cramping, contractions, LOF. Pt spoke with Dr. Gaspar yesterday. No questions at this time. Panorama results on chart for Dr. Noble to review.

## 2022-01-25 NOTE — PROGRESS NOTES
"  Consultation:     Serina Graham is a 26 y.o.  female G 1 P 0 LMP 10-27-21 ROLANDO 22 now at seen in consultation as requested by No ref. provider found secondary to:    1) Abnormal NIPT with increased risk for Trisomy 18 (91%).   2) Family h/o congenital cardiac malformation - patient's sister born with VSD requiring surgical correction at age 4;   and his aunt born with clubbed feet requiring surgical correction  3) CF carrier    Vitals:    22 1400   BP: 130/72   BP Location: Right arm   Patient Position: Sitting   Pulse: 75   Temp: 99.1 °F (37.3 °C)   Weight: 60.3 kg (133 lb)   Height: 165.1 cm (65\")        Pre- Labs:    Hemoglobin   Date Value Ref Range Status   2022 13.9 11.1 - 15.9 g/dL Final     Hematocrit   Date Value Ref Range Status   2022 40.5 34.0 - 46.6 % Final     Platelets   Date Value Ref Range Status   2022 245 150 - 450 x10E3/uL Final     Rubella Antibodies, IgG   Date Value Ref Range Status   2022 0.93 (L) Immune >0.99 index Final     Comment:     A second sample should be collected and tested no less than 2-4 weeks.                                  Non-immune       <0.90                                  Equivocal  0.90 - 0.99                                  Immune           >0.99       Hepatitis B Surface Ag   Date Value Ref Range Status   2022 Negative Negative Final     RPR   Date Value Ref Range Status   2022 Non Reactive Non Reactive Final     ABO Type   Date Value Ref Range Status   2022 B  Final     Rh Factor   Date Value Ref Range Status   2022 Negative  Final     Comment:     Please note: Prior records for this patient's ABO / Rh type are not  available for additional verification.       Antibody Screen   Date Value Ref Range Status   2022 Negative Negative Final     HIV Screen 4th Gen w/RFX (Reference)   Date Value Ref Range Status   2022 Non Reactive Non Reactive Final     Urine Culture   Date " Value Ref Range Status   2022 Final report  Final     Result 1   Date Value Ref Range Status   2022 No growth  Final     Neisseria gonorrhoeae, CARLOS   Date Value Ref Range Status   2022 Negative Negative Final     Chlamydia trachomatis, CARLOS   Date Value Ref Range Status   2022 Negative Negative Final     Note:   Date Value Ref Range Status   10/06/2020 Comment  Final     Comment:     The Pap smear is a screening test designed to aid in the detection of  premalignant and malignant conditions of the uterine cervix.  It is not a  diagnostic procedure and should not be used as the sole means of detecting  cervical cancer.  Both false-positive and false-negative reports do occur.           Previous Obstetrical History:    OB History    Para Term  AB Living   1 0 0 0 0 0   SAB IAB Ectopic Molar Multiple Live Births   0 0 0   0        # Outcome Date GA Lbr Tung/2nd Weight Sex Delivery Anes PTL Lv   1 Current                  Previous Gyn History:    Patient denies abnormal PAP/GC/Chlamydia/Syphilis.    Catamenia -  14 x 28-30 x 7  Contraception - none      Previous Medical History:    DM - patient denies HTN - patient denies Asthma - patient denies      Thyroid Disease - patient denies  Rheumatic Fever - patient denies  Heart - patient denies   Lung - patient denies   Liver - patient denies  Kidney - patient denies   Fever - patient denies  TB - patient denies Herpes - patient denies    UTI - patient denies   Epilepsy - patient denies  Other - neg    Previous Surgical History:    1)  - [place] teeth extraction without complications    Medications:    Prenatal Vitamins    Allergies:    NKDA    No Known Allergies      Current Outpatient Medications on File Prior to Visit   Medication Sig Dispense Refill   • Prenatal Vit w/Vj-Vfjonutkg-HR (PNV PO) Take  by mouth.       No current facility-administered medications on file prior to visit.        Habits:    Smoking - neg  Drinking -  neg  Drugs - neg    Psychosocial:    ; Software      Sexual Abuse History: never  Physical Abuse History: never  Verbal Abuse History: never    Family History:    DM - U, A x 2 HTN - neg Twins - neg Stillborns - neg  Birth defects - neg Mental Retardation - neg  Blood Dyscrasias - neg    Anesthesia Complications - neg Genetic - neg   and A with feet; pat    Review of Systems   Otherwise negative      Remainder of exam deferred.      TRISOMY 18:    Genetic counseling was then performed.  The association of Trisomy 18 with mental retardation, various birth defects and very poor prognosis of this abnormality was explained.  The risks and benefits of CVS and amniocentesis including a 1% risk of pregnancy loss per CVS vs, 1/350 risk of pregnancy loss per amnio were explained.    as well as management options were explained.  Results based on maternal serum screening are gestationally age dependent for accurate risk assessment.    Also discussed was alternative non-invasive  testing (NIPT) which can identify up to 99.1 % of fetuses with Trisomy 18.  Panorama NIPT - increased risk Tri 18 (91/100).  The risks and benefits of CVS and amnio as well as management options were discussed including a 1% risk for pregnancy loss with CVS vs. A 1/350-1/500 risk for pregnancy loss with amnio ere discussed.  Ptn and her  has decided to maintain the pregnancy but would like to return hans 4 weeks to undergo amniocentesis    CONGENITAL CARDIAC MALFORMATION: (Patient's sister had a VSD correction at age 4)    General counseling regarding congenital cardiac malformations was performed.  About 5 % of congenital cardiac malformations are associated with chromosomal anomalies.The multifactorial/polygenic nature of the majority of congenital cardiac malformations was explained including the concepts of genes at risk, environmental influences (ex. viral infection and DM) and recurrence risk which is based on the  number of first degree relatives affected as well as the severity of the disease.  With one affected first degree relative the recurrence risk is 2-5%.  Fetal ECHO @ 20-24 weeks is recommended.    CLUBFOOT: (Patient[s  had surgical correction of bilateral clubfeet as a child; husbands Aunt also had clubfeet)    General counseling was then performed regarding clubfoot.  Clubfoot is one of the more common birth defects.  The incidence varies from 1.12/1000 in Caucasians to 6.8/1000 in [place]ians.  Approximately 55% are bilateral.  The cause of clubfoot is incompletely understood.  Many cases are familial; therefore, a genetic factor is contributory, but the mode of inheritance is complex and may be multifactorial (2-8% recurrence with one affected individual).  Although it may be associated with extrinsic causes in some cases, such as oligohydramnios and constriction within the uterus, these are mostly third trimester phenomena.  Club foot deformity may be isolated or may be found in conjunction with numerous other associations, including general musculoskeletal disorders, arthrogryposis syndromes, genetic syndromes, CNS abnormalities, and karyotypic abnormalities (6-22%).  There is a high association with other congenital malformations.  Treatment of clubfoot after delivery in a neurologically normal infant with serial casting is successful in approximately 50% of cases.  If casting is unsuccessful, surgery also yields excellent results.      RH NEGATIVE:    General counseling was performed regarding Rh negative status.  The pathogenesis of isoimmunization was explained.  Although historically, the majority of isoimmunization occurred after delivery, a small percentage of isoimmunization has occurred during pregnancy, with or without an episode of vaginal bleeding.  Prophylactic Rhogam @ 28 weeks is recommended with a repeat dose after delivery (within 72 hours), if a Rh positive infant is born.    RUBELLA  NON-IMMUNE (RNI):    General counseling was then performed regarding Rubella Non-Immune (RNI) status.  The potential for fetal infection as well as its complications were explained.  MMR prior to discharge after delivery should be given.      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  Cystic Fibrosis is the most common Mendelian abnormality seen in Caucasians.  Cystic Fibrosis is caused by a mutation the Cystic Fibrosis Transregulator (CFTR) gene which is located on chromosome 7.  Carrier rate is approximately 1/21.  The autosomal recessive nature of this condition as well as typical complications including pulmonary disease, pancreatic disease and fertility problems were discussed.  A recurrence rate of 25 % was given.  Prenatal diagnosis is possible through CVS or amniocentesis.  Hyperechoic bowel is reported to be present in 50-78% of CF fetuses in the second and third trimester.  High frequency transducers utilized in /S may tend to accentuate the echogenicity of fetal bowel.    The frequency of CF in Hispanics is 1/8,000, the frequency of CF in  Americans is 1/15,300 and the incidence in  Americans is 1/32,000.    IMPRESSIONS:    1) Abnormal NIPT with increased risk for Trisomy 18 (91%).   2) Family h/o congenital cardiac malformation - patient's sister born with VSD requiring surgical correction at age 4;   and his aunt born with clubbed feet requiring surgical correction  3) Rh negative  4) Rubella non-immune  5) CF carrier    RECOMMENDATIONS:    1) Follow-up growth, anatomy and amnio in 4 weeks  2) Rhogam @ 28 weeks  3) MMR PP   4) Fetal ECHO @ 24 weeks  5) Additional management based on amniocentesis results  6) Test  for CF      Total time spent TODAY on this encounter, including pre-visit review of separately obtained history, face-to-face interaction including greater  than 50% time spent in consultation performing medically appropriate physical exam, pat 80 minutes.      Thank you for utilizing our ultrasound and consultative services.  If I may be of any further service, please do not hesitate to contact me.    Sincerely,    Hayder Noble MD  Maternal-Fetal Medicine

## 2022-01-31 ENCOUNTER — TELEPHONE (OUTPATIENT)
Dept: OBSTETRICS AND GYNECOLOGY | Facility: CLINIC | Age: 27
End: 2022-01-31

## 2022-01-31 NOTE — TELEPHONE ENCOUNTER
Call made to Serina to check in on her and her . No answer. Left voicemail for patient to call back if interested.

## 2022-02-07 ENCOUNTER — ROUTINE PRENATAL (OUTPATIENT)
Dept: OBSTETRICS AND GYNECOLOGY | Age: 27
End: 2022-02-07

## 2022-02-07 VITALS — BODY MASS INDEX: 22.47 KG/M2 | DIASTOLIC BLOOD PRESSURE: 74 MMHG | WEIGHT: 135 LBS | SYSTOLIC BLOOD PRESSURE: 124 MMHG

## 2022-02-07 DIAGNOSIS — O26.899 RH NEGATIVE, ANTEPARTUM: ICD-10-CM

## 2022-02-07 DIAGNOSIS — O28.5 ABNORMAL GENETIC TEST DURING PREGNANCY: ICD-10-CM

## 2022-02-07 DIAGNOSIS — Z34.90 PREGNANCY, UNSPECIFIED GESTATIONAL AGE: ICD-10-CM

## 2022-02-07 DIAGNOSIS — Z82.79 FAMILY HISTORY OF CONGENITAL HEART DISEASE IN SISTER: ICD-10-CM

## 2022-02-07 DIAGNOSIS — O35.2XX0 HEREDITARY DISEASE IN FAMILY POSSIBLY AFFECTING FETUS, AFFECTING MANAGEMENT OF MOTHER IN PREGNANCY, SINGLE OR UNSPECIFIED FETUS: ICD-10-CM

## 2022-02-07 DIAGNOSIS — Z14.1 CYSTIC FIBROSIS CARRIER: ICD-10-CM

## 2022-02-07 DIAGNOSIS — Z67.91 RH NEGATIVE, ANTEPARTUM: ICD-10-CM

## 2022-02-07 DIAGNOSIS — Z13.89 SCREENING FOR BLOOD OR PROTEIN IN URINE: Primary | ICD-10-CM

## 2022-02-07 LAB
GLUCOSE UR STRIP-MCNC: NEGATIVE MG/DL
PROT UR STRIP-MCNC: NEGATIVE MG/DL

## 2022-02-07 PROCEDURE — 0502F SUBSEQUENT PRENATAL CARE: CPT | Performed by: OBSTETRICS & GYNECOLOGY

## 2022-02-07 NOTE — PROGRESS NOTES
Patient has met with maternal-fetal medicine and amniocentesis is planned for February 23. Patient is not having any bleeding. No complaints. She is here today with her .    Doppler heart tones are positive and blood pressure is normal today.    Assessment-14 weeks  Possible trisomy 18 by cell free DNA-amniocentesis is scheduled for February 23.  Cystic fibrosis carrier-we will test the patient's  today  Rh-  Rubella nonimmune  Family history of congenital heart disease in her sister-plan for echo later in pregnancy.  History of seizure disorder

## 2022-02-16 ENCOUNTER — TRANSCRIBE ORDERS (OUTPATIENT)
Dept: ULTRASOUND IMAGING | Facility: HOSPITAL | Age: 27
End: 2022-02-16

## 2022-02-16 DIAGNOSIS — O28.5 ABNORMAL GENETIC TEST DURING PREGNANCY: Primary | ICD-10-CM

## 2022-02-23 ENCOUNTER — HOSPITAL ENCOUNTER (OUTPATIENT)
Dept: ULTRASOUND IMAGING | Facility: HOSPITAL | Age: 27
Discharge: HOME | End: 2022-02-23

## 2022-02-23 ENCOUNTER — HOSPITAL ENCOUNTER (OUTPATIENT)
Dept: INFUSION THERAPY | Facility: HOSPITAL | Age: 27
Discharge: HOME | End: 2022-02-23

## 2022-02-23 ENCOUNTER — OFFICE VISIT (OUTPATIENT)
Dept: OBSTETRICS AND GYNECOLOGY | Facility: CLINIC | Age: 27
End: 2022-02-23

## 2022-02-23 VITALS
OXYGEN SATURATION: 98 % | RESPIRATION RATE: 20 BRPM | DIASTOLIC BLOOD PRESSURE: 57 MMHG | HEART RATE: 64 BPM | SYSTOLIC BLOOD PRESSURE: 109 MMHG | TEMPERATURE: 98 F

## 2022-02-23 VITALS
WEIGHT: 135 LBS | TEMPERATURE: 98 F | BODY MASS INDEX: 22.49 KG/M2 | HEIGHT: 65 IN | HEART RATE: 75 BPM | DIASTOLIC BLOOD PRESSURE: 73 MMHG | SYSTOLIC BLOOD PRESSURE: 125 MMHG

## 2022-02-23 DIAGNOSIS — O26.892 RH NEGATIVE STATUS DURING PREGNANCY IN SECOND TRIMESTER: ICD-10-CM

## 2022-02-23 DIAGNOSIS — Z67.91 RH NEGATIVE, ANTEPARTUM: Primary | ICD-10-CM

## 2022-02-23 DIAGNOSIS — Z67.91 RH NEGATIVE STATUS DURING PREGNANCY IN SECOND TRIMESTER: ICD-10-CM

## 2022-02-23 DIAGNOSIS — O26.899 RH NEGATIVE, ANTEPARTUM: Primary | ICD-10-CM

## 2022-02-23 DIAGNOSIS — O35.10X0 SUSPECTED CHROMOSOME ANOMALY OF FETUS AFFECTING MANAGEMENT OF MOTHER, ANTEPARTUM, SINGLE OR UNSPECIFIED FETUS: ICD-10-CM

## 2022-02-23 DIAGNOSIS — O28.5 ABNORMAL GENETIC TEST DURING PREGNANCY: ICD-10-CM

## 2022-02-23 DIAGNOSIS — O28.5 ABNORMAL GENETIC TEST DURING PREGNANCY: Primary | ICD-10-CM

## 2022-02-23 PROCEDURE — 25010000002 RHO D IMMUNE GLOBULIN 1500 UNIT/2ML SOLUTION PREFILLED SYRINGE: Performed by: OBSTETRICS & GYNECOLOGY

## 2022-02-23 PROCEDURE — 99215 OFFICE O/P EST HI 40 MIN: CPT | Performed by: OBSTETRICS & GYNECOLOGY

## 2022-02-23 PROCEDURE — 76811 OB US DETAILED SNGL FETUS: CPT | Performed by: OBSTETRICS & GYNECOLOGY

## 2022-02-23 PROCEDURE — 76811 OB US DETAILED SNGL FETUS: CPT

## 2022-02-23 PROCEDURE — 59000 AMNIOCENTESIS DIAGNOSTIC: CPT

## 2022-02-23 PROCEDURE — 76946 ECHO GUIDE FOR AMNIOCENTESIS: CPT

## 2022-02-23 PROCEDURE — 59300 EPISIOTOMY OR VAGINAL REPAIR: CPT | Performed by: OBSTETRICS & GYNECOLOGY

## 2022-02-23 PROCEDURE — 96372 THER/PROPH/DIAG INJ SC/IM: CPT

## 2022-02-23 PROCEDURE — 76946 ECHO GUIDE FOR AMNIOCENTESIS: CPT | Performed by: OBSTETRICS & GYNECOLOGY

## 2022-02-23 RX ADMIN — HUMAN RHO(D) IMMUNE GLOBULIN 1500 UNITS: 1500 SOLUTION INTRAMUSCULAR; INTRAVENOUS at 12:30

## 2022-02-23 NOTE — PROGRESS NOTES
Pt reports that she is doing well and denies vaginal bleeding, cramping, contractions, LOF. Not feeling fetal movements at this time. Education provided on amniocentesis at this time. Pt aware she will need Rhogam following procedure. Will sign consent forms after MD discusses procedure further with patient. Next OB appointment scheduled for early March with Dr. Gaspar.

## 2022-02-23 NOTE — PATIENT INSTRUCTIONS
Rh0 [D] Immune Globulin injection  What is this medicine?  RhO [D] IMMUNE GLOBULIN (i OBED brown) is used to treat idiopathic thrombocytopenic purpura (ITP). This medicine is used in RhO negative mothers who are pregnant with a RhO positive child. It is also used after a transfusion of RhO positive blood into a RhO negative person.  This medicine may be used for other purposes; ask your health care provider or pharmacist if you have questions.  COMMON BRAND NAME(S): BayRho-D, HyperRHO S/D, MICRhoGAM, RhoGAM, Rhophylac, WinRho SDF  What should I tell my health care provider before I take this medicine?  They need to know if you have any of these conditions:  · bleeding disorders  · low levels of immunoglobulin A in the body  · no spleen  · an unusual or allergic reaction to human immune globulin, other medicines, foods, dyes, or preservatives  · pregnant or trying to get pregnant  · breast-feeding  How should I use this medicine?  This medicine is for injection into a muscle or into a vein. It is given by a health care professional in a hospital or clinic setting.  Talk to your pediatrician regarding the use of this medicine in children. This medicine is not approved for use in children.  Overdosage: If you think you have taken too much of this medicine contact a poison control center or emergency room at once.  NOTE: This medicine is only for you. Do not share this medicine with others.  What if I miss a dose?  It is important not to miss your dose. Call your doctor or health care professional if you are unable to keep an appointment.  What may interact with this medicine?  · live virus vaccines, like measles, mumps, or rubella  This list may not describe all possible interactions. Give your health care provider a list of all the medicines, herbs, non-prescription drugs, or dietary supplements you use. Also tell them if you smoke, drink alcohol, or use illegal drugs. Some items may interact with your  medicine.  What should I watch for while using this medicine?  This medicine is made from human blood. It may be possible to pass an infection in this medicine. Talk to your doctor about the risks and benefits of this medicine.  This medicine may interfere with live virus vaccines. Before you get live virus vaccines tell your health care professional if you have received this medicine within the past 3 months.  What side effects may I notice from receiving this medicine?  Side effects that you should report to your doctor or health care professional as soon as possible:  · allergic reactions like skin rash, itching or hives, swelling of the face, lips, or tongue  · breathing problems  · chest pain or tightness  · yellowing of the eyes or skin  Side effects that usually do not require medical attention (report to your doctor or health care professional if they continue or are bothersome):  · fever  · pain and tenderness at site where injected  This list may not describe all possible side effects. Call your doctor for medical advice about side effects. You may report side effects to FDA at 9-571-FDA-8617.  Where should I keep my medicine?  This drug is given in a hospital or clinic and will not be stored at home.  NOTE: This sheet is a summary. It may not cover all possible information. If you have questions about this medicine, talk to your doctor, pharmacist, or health care provider.  © 2021 [employer]vier/Gold Standard (2009-08-17 14:06:10)

## 2022-02-24 NOTE — PROGRESS NOTES
AMA Consult    Dear  [email or web address]   Thank-you for referring Serina Graham for a Maternal Fetal Medicine consult for abnormal  cell free DNA. As you know Ms. Serina Graham is a 26 y.o.  at 17w1d who  had a cell free DNA that revealed 91/100 risk for trisomy 21. Patient denies infections during the pregnancy, history of aneuploidy, or family history of cardiac defects, bleeding during the pregnancy.     Denies nausea or vomiting  Denies abdominal pain/cramping/tenderness/contractions  Denies vaginal bleeding or leaking of fluid    OB History    Para Term  AB Living   1 0 0 0 0 0   SAB IAB Ectopic Molar Multiple Live Births   0 0 0   0        # Outcome Date GA Lbr Tung/2nd Weight Sex Delivery Anes PTL Lv   1 Current              The remainder of her pregnancy has been uncomplicated.   has a past medical history of Asthma, IBS (irritable bowel syndrome), Iron deficiency anemia, Irregular menses, Migraine, Seizure, partial (HCC), and Syncope, vasovagal.   has a past surgical history that includes [place] tooth extraction.  No Known Allergies    Current Outpatient Medications:   •  Prenatal Vit w/Uw-Aoaaumwgd-WS (PNV PO), Take  by mouth., Disp: , Rfl:   •  Prenatal Vit-Fe Fumarate-FA (PRENATAL VITAMIN AND MINERAL PO), Take 1 tablet by mouth Daily., Disp: , Rfl:   No current facility-administered medications for this visit.  family history includes Breast cancer (age of onset: 48) in her maternal grandmother; Cervical cancer in her maternal aunt; Colon cancer (age of onset: 45) in her paternal uncle; Diabetes in her maternal aunt and maternal uncle; Lung cancer in her maternal grandmother; No Known Problems in her father, mother, and sister.  Social History     Tobacco Use   • Smoking status: Never Smoker   • Smokeless tobacco: Never Used   Vaping Use   • Vaping Use: Never used   Substance Use Topics   • Alcohol use: No   • Drug use: Never       PHYSICAL EXAM:  /73 (BP Location: Right  "arm, Patient Position: Sitting)   Pulse 75   Temp 98 °F (36.7 °C)   Ht 165.1 cm (65\")   Wt 61.2 kg (135 lb)   LMP 10/27/2021   BMI 22.47 kg/m²   General: pleasant, alert and oriented  Abdomen: soft, non tender, gravid  Extremites: bilat lower extremities soft, non tender, no edema noted    [facility]  PAT NAME: BROOKLYNN DEMARCO  MED REC#: [id number]  BIRTH DA: 1995  PAT GEND: F  ACCOUNT#: [id number]  PAT TYPE: O  EXAM LUZ: [id number]  REF PHYS JE SEVILLA  ACCESSION [id number]    Patient Status  ============    Outpatient    Indication  ========    NIPT trisomy 18    Pregnancy History  ===============    Maternal Lab Tests  Test: NIPT  Result of other maternal screening test: Trisomy 18    Maternal Assessment  ==================    Height 165 cm  Height (ft) 5 ft  Height (in) 5 in  Weight 61 kg  Weight (lb) 135 lb  BMI 22.47 kg/m²  BP syst 123 mmHg  BP diast 73 mmHg  Heart rate 75 bpm    Method  =======    Transabdominal ultrasound examination. View: Suboptimal view: limited by fetal position    Pregnancy  =========    [facility] pregnancy. Number of fetuses: 1    Dating  ======    Method of dating: based on stated ROLANDO  GA by prior assessment 17 w + 0 d  ROLANDO by prior assessment: 8/3/2022  Ultrasound examination on: 2022  GA by U/S based upon: AC, BPD, Femur, HC  GA by U/S 16 w + 0 d  ROLANDO by U/S: 8/10/2022  Previous dating: based on stated ROLANDO, selected on 2022  Agreed ROLANDO of previous datin/3/2022  Assigned: based on stated ROLANDO, selected on 2022  Assigned GA 17 w + 0 d  Assigned ROLANDO: 8/3/2022  Pregnancy length 280 d    Fetal Biometry  ============    Standard  BPD 36.2 mm  17w 1d        54%        Hadlock    OFD 44.4 mm  17w 1d        54%        Kelsea    .0 mm  16w 4d        19%        Hadlock    Cerebellum tr 16.2 mm  16w 4d        21%        Hill    Nuchal fold 3.1 mm  AC 91.7 mm  15w 2d        7%        Hadlock    Femur 17.3 mm  15w 1d        2%       "  Hadlock    Humerus 19.3 mm  15w 5d        13%        Kelsea    HC / AC 1.41          >99%        Hadlock     g    EFW (lb) 0 lb  EFW (oz) 4 oz  EFW by: Hadlock (BPD-HC-AC-FL)  Extended  Radius 14.2 mm  14w 4d        15%        Kelsea    Ulna 17.7 mm  16w 0d        14%        Kelsea     6.0 mm  CM 2.9 mm          11%        Nicolaides    Nasal bone 4.6 mm    Head / Face / Neck  Cephalic index 0.82          72%        Nicolaides    Extremities / Bony Struc  FL / BPD 0.48          <1%        Hadlock    FL / HC 0.13          <1%        Hadlock    FL / AC 0.19          15%        Hadlock    Other Structures   bpm    General Evaluation  ================    Cardiac activity present.  bpm.  Fetal movements present.  Presentation cephalic.  Placenta posterior.  Umbilical cord Cord vessels: 3 vessel cord. Insertion site: placental insertion: normal.  Amniotic fluid Amount of AF: normal. MVP 5.2 cm.    Fetal Anatomy  ============    Cranium: suboptimal  Midline falx: Appears normal  Cavum septi pellucidi: suboptimal  Cerebellum: Appears normal  Cisterna magna: Appears normal  Head / Neck  Rt lateral ventricle: Appears normal  Lt lateral ventricle: Appears normal  Rt choroid plexus: Appears normal  Lt choroid plexus: Appears normal  Neck: Appears normal  Nuchal fold: Appears normal  Lips: suboptimal  Profile: suboptimal  Nose: Appears normal  Face  Nose: Nasal bone present  Palate: suboptimal  Mandible: suboptimal  Orbits: suboptimal  4-chamber view: ABNORMAL  RVOT view: ABNORMAL  LVOT view: ABNORMAL  Heart / Thorax  Aortic arch view: not visualized  Ductal arch view: not visualized  SVC: normal  IVC: normal  3-vessel view: ABNORMAL  Rt lung: Appears normal  Lt lung: normal  Diaphragm: Appears normal  Diaphragm: Intact  Cord insertion: Appears normal  Stomach: suboptimal  Bladder: Appears normal  Abdomen  Stomach: small  Rt kidney: not visualized  Lt kidney: normal  Liver: normal  Small  bowel: normal  Large bowel: normal  Cervical spine: Appears normal  Thoracic spine: Appears normal  Lumbar spine: Appears normal  Sacral spine: Appears normal  Arms: Appears normal  Legs: Appears normal  Rt upper arm: Appears normal  Rt forearm: Appears normal  Rt hand: suboptimal  Lt upper arm: Appears normal  Lt forearm: Appears normal  Lt hand: suboptimal  Rt upper leg: Appears normal  Rt lower leg: Appears normal  Rt foot: suboptimal  Lt upper leg: Appears normal  Lt lower leg: Appears normal  Lt foot: suboptimal  Gender: poorly seen  Wants to know gender: yes    Maternal Structures  ================    Uterus / Cervix  Approach: Transabdominal  Cervical length 43.5 mm  Ovaries / Tubes / Adnexa  Rt ovary: Not visualized  Lt ovary: Not visualized    Impression  =========    Abnormal heart-enlarged aorta, small pulmonary artery, VSD, unable to attain 3VV.  Small stomach,  IUGR  possible absent right kidney.  Strawberry shaped skull.    After informed consent obtained an amniocentesis was attempted under ultrasound guidance. 2 insertions in the uterus, 1 possibly in placenta. Amniotic cavity never  breeched. No fluid obtained. Fetal cardiac activity documented before and after the proceedure    Coding  ======    Description: [id number] Detailed Ultrasound  Description: [id number] Ultrasound Guidance - Amnio    Sonographer: Muriel Augillon RDMS, RDCS  Physician: Tresa Crawford MD    Electronically signed by: Tresa Crawford MD at:  10:54        ASSESSMENT:  Findings on ultrasound highly suspicious for trisomy 18.  Ultrasound findings and options for testing were discussed including  amniocentesis. The risks, benefits, sensitivity and specificity of each test was explained. Major markers and soft markers for trisomy were discussed. Patient desires amniocentesis    Rh-, rhogam given    Recommendations:  amniocentesis attempted patient may have repeat attempt in 2 days  referral for  coordination and  bereavement counseling    Again thank-you for referring for a maternal fetal medicine consult. If we can be of any further assistance to you please do not hesitate to contact us.  Total consult time minutes with greater than 50% spent in counseling      Again thank you for requesting a MFM consult.  If we can be of any further assistance to you, please do not hesitate to contact us.    Tresa Crawford MD  MATERNAL FETAL MEDICINE              VISIT SYNOPSIS:    ASSESSMENT/PLAN:  MS. Serina Graham is a 26 y.o.  at 17w1d with the following visit diagnoses    Diagnoses and all orders for this visit:    1. Abnormal genetic test during pregnancy (Primary)    2. Rh negative status during pregnancy in second trimester  -     Ambulatory Referral to [facility] For Infusion Treatment    3. Suspected chromosome anomaly of fetus affecting management of mother, antepartum, single or unspecified fetus  Overview:  Abnormal NIPT Tri 18          The above diagnoses have been evaluated and determined to be stable    This note has been routed to Dr. Gaspar     At the end of this consultation all patient questions were answered, concerns addressed, and comprehensive management plan and follow up reviewed with patient.      I spent 60 minutes caring for Serina on this date of service. This time includes time spent by me in the following activities: reviewing tests, obtaining and/or reviewing a separately obtained history, performing a medically appropriate examination and/or evaluation, counseling and educating the patient/family/caregiver and independently interpreting results and communicating that information with the patient/family/caregiver with greater than 50% spent in counseling and coordination of care.

## 2022-03-08 ENCOUNTER — TRANSCRIBE ORDERS (OUTPATIENT)
Dept: ULTRASOUND IMAGING | Facility: HOSPITAL | Age: 27
End: 2022-03-08

## 2022-03-08 ENCOUNTER — ROUTINE PRENATAL (OUTPATIENT)
Dept: OBSTETRICS AND GYNECOLOGY | Age: 27
End: 2022-03-08

## 2022-03-08 ENCOUNTER — OFFICE VISIT (OUTPATIENT)
Dept: OBSTETRICS AND GYNECOLOGY | Facility: CLINIC | Age: 27
End: 2022-03-08

## 2022-03-08 ENCOUNTER — HOSPITAL ENCOUNTER (OUTPATIENT)
Dept: ULTRASOUND IMAGING | Facility: HOSPITAL | Age: 27
Discharge: HOME | End: 2022-03-08

## 2022-03-08 ENCOUNTER — HOSPITAL ENCOUNTER (OUTPATIENT)
Dept: INFUSION THERAPY | Facility: HOSPITAL | Age: 27
Discharge: HOME | End: 2022-03-08

## 2022-03-08 ENCOUNTER — TRANSCRIBE ORDERS (OUTPATIENT)
Dept: ADMINISTRATIVE | Facility: HOSPITAL | Age: 27
End: 2022-03-08

## 2022-03-08 VITALS
SYSTOLIC BLOOD PRESSURE: 108 MMHG | TEMPERATURE: 96.6 F | HEART RATE: 73 BPM | DIASTOLIC BLOOD PRESSURE: 64 MMHG | RESPIRATION RATE: 20 BRPM | OXYGEN SATURATION: 100 %

## 2022-03-08 VITALS
DIASTOLIC BLOOD PRESSURE: 70 MMHG | BODY MASS INDEX: 23.63 KG/M2 | SYSTOLIC BLOOD PRESSURE: 126 MMHG | HEIGHT: 64 IN | TEMPERATURE: 98.2 F | HEART RATE: 69 BPM | WEIGHT: 138.4 LBS

## 2022-03-08 VITALS — DIASTOLIC BLOOD PRESSURE: 76 MMHG | BODY MASS INDEX: 22.47 KG/M2 | SYSTOLIC BLOOD PRESSURE: 114 MMHG | WEIGHT: 135 LBS

## 2022-03-08 DIAGNOSIS — O26.899 RH NEGATIVE, ANTEPARTUM: ICD-10-CM

## 2022-03-08 DIAGNOSIS — O42.90: Primary | ICD-10-CM

## 2022-03-08 DIAGNOSIS — O28.5 ABNORMAL GENETIC TEST DURING PREGNANCY: ICD-10-CM

## 2022-03-08 DIAGNOSIS — O35.10X0 SUSPECTED CHROMOSOME ANOMALY OF FETUS AFFECTING MANAGEMENT OF MOTHER, ANTEPARTUM, SINGLE OR UNSPECIFIED FETUS: ICD-10-CM

## 2022-03-08 DIAGNOSIS — O26.899 RH NEGATIVE, ANTEPARTUM: Primary | ICD-10-CM

## 2022-03-08 DIAGNOSIS — O35.2XX0 HEREDITARY DISEASE IN FAMILY POSSIBLY AFFECTING FETUS, AFFECTING MANAGEMENT OF MOTHER IN PREGNANCY, SINGLE OR UNSPECIFIED FETUS: ICD-10-CM

## 2022-03-08 DIAGNOSIS — Z67.91 RH NEGATIVE, ANTEPARTUM: ICD-10-CM

## 2022-03-08 DIAGNOSIS — R56.9: ICD-10-CM

## 2022-03-08 DIAGNOSIS — Z13.89 SCREENING FOR BLOOD OR PROTEIN IN URINE: Primary | ICD-10-CM

## 2022-03-08 DIAGNOSIS — Z67.91 RH NEGATIVE, ANTEPARTUM: Primary | ICD-10-CM

## 2022-03-08 DIAGNOSIS — O35.10X0 SUSPECTED CHROMOSOME ANOMALY OF FETUS AFFECTING MANAGEMENT OF MOTHER, ANTEPARTUM, SINGLE OR UNSPECIFIED FETUS: Primary | ICD-10-CM

## 2022-03-08 DIAGNOSIS — O28.5 ABNORMAL GENETIC TEST DURING PREGNANCY: Primary | ICD-10-CM

## 2022-03-08 DIAGNOSIS — Z3A.18 18 WEEKS GESTATION OF PREGNANCY: ICD-10-CM

## 2022-03-08 PROBLEM — O35.8XX0 MATERNAL CARE FOR OTHER (SUSPECTED) FETAL ABNORMALITY AND DAMAGE, NOT APPLICABLE OR UNSPECIFIED: Status: ACTIVE | Noted: 2022-03-08

## 2022-03-08 LAB
GLUCOSE UR STRIP-MCNC: NEGATIVE MG/DL
PROT UR STRIP-MCNC: NEGATIVE MG/DL

## 2022-03-08 PROCEDURE — 76946 ECHO GUIDE FOR AMNIOCENTESIS: CPT | Performed by: OBSTETRICS & GYNECOLOGY

## 2022-03-08 PROCEDURE — 96372 THER/PROPH/DIAG INJ SC/IM: CPT

## 2022-03-08 PROCEDURE — 59000 AMNIOCENTESIS DIAGNOSTIC: CPT | Performed by: OBSTETRICS & GYNECOLOGY

## 2022-03-08 PROCEDURE — 0502F SUBSEQUENT PRENATAL CARE: CPT | Performed by: OBSTETRICS & GYNECOLOGY

## 2022-03-08 PROCEDURE — 76815 OB US LIMITED FETUS(S): CPT | Performed by: OBSTETRICS & GYNECOLOGY

## 2022-03-08 PROCEDURE — 76946 ECHO GUIDE FOR AMNIOCENTESIS: CPT

## 2022-03-08 PROCEDURE — 99212 OFFICE O/P EST SF 10 MIN: CPT | Performed by: OBSTETRICS & GYNECOLOGY

## 2022-03-08 PROCEDURE — 76815 OB US LIMITED FETUS(S): CPT

## 2022-03-08 PROCEDURE — 25010000002 RHO D IMMUNE GLOBULIN 1500 UNIT/2ML SOLUTION PREFILLED SYRINGE: Performed by: OBSTETRICS & GYNECOLOGY

## 2022-03-08 PROCEDURE — 59000 AMNIOCENTESIS DIAGNOSTIC: CPT

## 2022-03-08 RX ADMIN — HUMAN RHO(D) IMMUNE GLOBULIN 1500 UNITS: 1500 SOLUTION INTRAMUSCULAR; INTRAVENOUS at 12:35

## 2022-03-08 NOTE — PROGRESS NOTES
The patient had attempted amniocentesis with Dr. Wilburn but fluid was unable to be obtained.  The patient and her  would like to meet with Dr. Noble if possible.  She does have an appointment with palliative care next week.  At this point the patient and her  would like to continue the pregnancy.  They would like to record the baby's heartbeat today.  They did find out the baby is a girl.  Patient is not having any contractions or bleeding.  She has not felt fetal movement yet.    's carrier test was negative for cystic fibrosis.  Patient did receive RhoGam after amniocentesis attempts  Blood pressure 114/76 with no protein  Doppler heart tones are positive at 141  Weight gain for pregnancy of 10 pounds.    Assessment-18 weeks 6 days  Suspected trisomy 18 by cell free DNA and multiple ultrasound markers.  Amniocentesis attempt was unsuccessful.  Patient would like to meet with Dr. Noble.  We talked to the reproductive imaging center and they can have the patient come over today which the patient is very happy about.  We discussed risk of early delivery, risk of fetal demise and risk of fetal death after delivery.  We discussed parents should decide about resuscitation.  They will know more after their palliative care conference.  Cystic fibrosis carrier- was negative  Rh--patient received RhoGam after amniocentesis attempts  Rubella nonimmune  Seizure disorder  Follow-up with me in 2 weeks.

## 2022-03-08 NOTE — PROGRESS NOTES
Patient had nausea and vomiting when she first arrived to our unit. She had just had an amniocentesis. After getting her into a room and some crackers she felt better and had no further vomiting. She tolerated injection with no complaints or concerns.

## 2022-03-08 NOTE — EXTERNAL PATIENT INSTRUCTIONS
Patient Education   Table of Contents       Rh0 [D] Immune Globulin injection     To view videos and all your education online visit,   [email or web address]   or scan this QR code with your smartphone.                  Rh0 [D] Immune Globulin injection     What is this medication?   RhO [D] IMMUNE GLOBULIN (i OBED brown) is used to treat idiopathic thrombocytopenic purpura (ITP). This medicine is used in RhO negative mothers who are pregnant with a RhO positive child. It is also used after a transfusion of RhO positive blood into a RhO negative person.   This medicine may be used for other purposes; ask your health care provider or pharmacist if you have questions.   COMMON BRAND NAME(S): BayRho-D, HyperRHO S/D, MICRhoGAM, RhoGAM, Rhophylac, WinRho SDF   What should I tell my care team before I take this medication?   They need to know if you have any of these conditions:         bleeding disorders       low levels of immunoglobulin A in the body       no spleen       an unusual or allergic reaction to human immune globulin, other medicines, foods, dyes, or preservatives       pregnant or trying to get pregnant       breast-feeding     How should I use this medication?   This medicine is for injection into a muscle or into a vein. It is given by a health care professional in a hospital or clinic setting.   Talk to your pediatrician regarding the use of this medicine in children. This medicine is not approved for use in children.   Overdosage: If you think you have taken too much of this medicine contact a poison control center or emergency room at once.   NOTE: This medicine is only for you. Do not share this medicine with others.   What if I miss a dose?   It is important not to miss your dose. Call your doctor or health care professional if you are unable to keep an appointment.   What may interact with this medication?         live virus vaccines, like measles, mumps, or rubella     This list  may not describe all possible interactions. Give your health care provider a list of all the medicines, herbs, non-prescription drugs, or dietary supplements you use. Also tell them if you smoke, drink alcohol, or use illegal drugs. Some items may interact with your medicine.   What should I watch for while using this medication?   This medicine is made from human blood. It may be possible to pass an infection in this medicine. Talk to your doctor about the risks and benefits of this medicine.   This medicine may interfere with live virus vaccines. Before you get live virus vaccines tell your health care professional if you have received this medicine within the past 3 months.   What side effects may I notice from receiving this medication?   Side effects that you should report to your doctor or health care professional as soon as possible:         allergic reactions like skin rash, itching or hives, swelling of the face, lips, or tongue       breathing problems       chest pain or tightness       yellowing of the eyes or skin     Side effects that usually do not require medical attention (report to your doctor or health care professional if they continue or are bothersome):         fever       pain and tenderness at site where injected     This list may not describe all possible side effects. Call your doctor for medical advice about side effects. You may report side effects to FDA at 4-406-XFF-6505.   Where should I keep my medication?   This drug is given in a hospital or clinic and will not be stored at home.   NOTE: This sheet is a summary. It may not cover all possible information. If you have questions about this medicine, talk to your doctor, pharmacist, or health care provider.     ? 2022 [employer]/Gold Standard (2009-08-17 14:06:10)

## 2022-03-08 NOTE — PROGRESS NOTES
"Serina Graham is a 26 y.o. female,  Patient's last menstrual period was 10/27/2021. EDC of 8/3/2022, by Last Menstrual Period now 18w6d weeks seen for F/U as requested by Mark Gaspar MD secondary to:    1) Panorama NIPT high risk for Tri 18    Vitals:    22 1037   BP: 126/70   BP Location: Right arm   Patient Position: Sitting   Pulse: 69   Temp: 98.2 °F (36.8 °C)   Weight: 62.8 kg (138 lb 6.4 oz)   Height: 162.6 cm (64\")     Risks, benefits and management options discussed with patient and her partner.  Questions answered    Amniocentesis was then performed under continuous U/S guidance in sterile fashion without incident 30 cc clear AF sent for AFP and karyotype.  Rhogam ordered.  FH stable before, during and after procedure.    Specimen sent for AFP and karyotype.    ASSESSMENT:  Diagnoses and all orders for this visit:    1. Rh negative, antepartum (Primary)  -     Ambulatory Referral to [facility] For Infusion Treatment    2. Abnormal genetic test during pregnancy  -     Ambulatory Referral to [facility] For Infusion Treatment        ICD-10-CM ICD-9-CM   1. Rh negative, antepartum  O26.899 646.83    Z67.91    2. Abnormal genetic test during pregnancy  O28.5 796.5        Questions answered.    Recommendations:    1) Follow-up in 2 weeks    Total time spent TODAY on this encounter, including pre-visit review of separately obtained history, face-to-face interaction performing medically appropriate physical exam, patient counseling/education, interpretation of diagnostic results, care coordination and documentation was 15 minutes.      Thank you for utilizing our ultrasound and consultative services.  If I may be of any further service, please do not hesitate to contact me.    Sincerely,    Hayder Noble MD  Maternal-Fetal Medicine    "

## 2022-03-08 NOTE — PROGRESS NOTES
Pt reports that she is doing well and denies vaginal bleeding, cramping, contractions, LOF. Reports feeling flutters at this time. Education and post procedure instructions reviewed with patient. Pt reports no questions at this time.

## 2022-03-21 ENCOUNTER — TELEPHONE (OUTPATIENT)
Dept: OBSTETRICS AND GYNECOLOGY | Age: 27
End: 2022-03-21

## 2022-03-21 ENCOUNTER — TELEPHONE (OUTPATIENT)
Dept: OBSTETRICS AND GYNECOLOGY | Facility: CLINIC | Age: 27
End: 2022-03-21

## 2022-03-21 ENCOUNTER — APPOINTMENT (OUTPATIENT)
Dept: ULTRASOUND IMAGING | Facility: HOSPITAL | Age: 27
End: 2022-03-21

## 2022-03-21 NOTE — TELEPHONE ENCOUNTER
Pt has appt with you tomorrow, Pt will not get amnio results till Thursday.Should she keep appt tomorrow or after MFM results

## 2022-03-21 NOTE — TELEPHONE ENCOUNTER
[facility]. [facility] representative states that the results should be finalized on 3/23. Dr. Noble notified and recommended patient come in on 3/24 at 8:30 to discuss results. Situation discussed with Serina. Pt verbalized understanding and appointment changed to 8:30 on Thursday, 3/24. Denies further questions at this time.

## 2022-03-24 ENCOUNTER — ROUTINE PRENATAL (OUTPATIENT)
Dept: OBSTETRICS AND GYNECOLOGY | Age: 27
End: 2022-03-24

## 2022-03-24 ENCOUNTER — OFFICE VISIT (OUTPATIENT)
Dept: OBSTETRICS AND GYNECOLOGY | Facility: CLINIC | Age: 27
End: 2022-03-24

## 2022-03-24 VITALS — SYSTOLIC BLOOD PRESSURE: 112 MMHG | BODY MASS INDEX: 24 KG/M2 | WEIGHT: 139.8 LBS | DIASTOLIC BLOOD PRESSURE: 74 MMHG

## 2022-03-24 DIAGNOSIS — O35.12X0 TRISOMY 18 OF FETUS IN CURRENT PREGNANCY, SINGLE OR UNSPECIFIED FETUS: Primary | ICD-10-CM

## 2022-03-24 DIAGNOSIS — O28.5 ABNORMAL GENETIC TEST DURING PREGNANCY: Primary | ICD-10-CM

## 2022-03-24 DIAGNOSIS — Z3A.21 21 WEEKS GESTATION OF PREGNANCY: ICD-10-CM

## 2022-03-24 DIAGNOSIS — O35.12X0 TRISOMY 18 OF FETUS IN CURRENT PREGNANCY, SINGLE OR UNSPECIFIED FETUS: ICD-10-CM

## 2022-03-24 LAB
GLUCOSE UR STRIP-MCNC: NEGATIVE MG/DL
PROT UR STRIP-MCNC: NEGATIVE MG/DL

## 2022-03-24 PROCEDURE — 99212 OFFICE O/P EST SF 10 MIN: CPT | Performed by: OBSTETRICS & GYNECOLOGY

## 2022-03-24 PROCEDURE — 0502F SUBSEQUENT PRENATAL CARE: CPT | Performed by: OBSTETRICS & GYNECOLOGY

## 2022-03-24 NOTE — PROGRESS NOTES
Serina Graham is a 26 y.o. female,  Patient's last menstrual period was 10/27/2021. EDC of 8/3/2022, by Last Menstrual Period now 21w1d weeks seen for F/U as requested by Mrak Gaspar MD secondary to:    1) Suspected Trisomy 18 here to discuss amniocentesis results.    Amnio - 47 XX+18.  Discussed the very poor prognosis (72-87 % miscarriage stillbirth rate - some authors have quoted 90-95%).  Of the babies born alive the  majority die in the first 2 weeks of life.  An empiric recurrence rate of 1% was given.      ASSESSMENT:      1) Fetus with 47 XX+18 karyotype.      Questions answered.    Recommendations:    1) F/U with MFM as clinically indicated    Total time spent TODAY on this encounter, including pre-visit review of separately obtained history, face-to-face interaction performing medically appropriate physical exam, patient counseling/education, interpretation of diagnostic results, care coordination and documentation was 15 minutes.      Thank you for utilizing our ultrasound and consultative services.  If I may be of any further service, please do not hesitate to contact me.    Sincerely,    Hayder Noble MD  Maternal-Fetal Medicine

## 2022-03-24 NOTE — PROGRESS NOTES
Patient received results today that the baby does have trisomy 18.  Dr. Noble informed the patient about the severe nature of the diagnosis.  Patient and her  are both here today.  They wish to continue the pregnancy.  They do not want any resuscitative measures done at delivery.  They wanted to know what to look out for as far as fetal movement and contractions if she were to go into labor early.    Hand-held ultrasound is used to show the parents the baby.  Baby is moving actively.  Blood pressure 112/74 with no protein    Assessment-21 weeks  Trisomy 18-we had a long discussion about trisomy 18.  The parents have good understanding of the risk of fetal demise, early delivery and demise soon after delivery.  They do not want any intubation at delivery.  They wish to hold the baby after delivery.  I did discuss with the patient that she would have the option of referral to Dr. Mitchell to consider termination of the pregnancy.  Patient desires continuing the pregnancy.  She has met with the palliative care team.  We discussed signs and symptoms of labor.  We discussed not intervening for abnormal fetal heart rate tracings.  Rh-  Cystic fibrosis carrier- was negative  Seizure disorder-patient has not had any seizure activity  Rubella nonimmune-immunization postdelivery  Ultrasound next week for measurements.  Discussed that we will expect IUGR at some point.

## 2022-04-01 ENCOUNTER — ROUTINE PRENATAL (OUTPATIENT)
Dept: OBSTETRICS AND GYNECOLOGY | Age: 27
End: 2022-04-01

## 2022-04-01 VITALS — SYSTOLIC BLOOD PRESSURE: 108 MMHG | DIASTOLIC BLOOD PRESSURE: 74 MMHG | BODY MASS INDEX: 23.69 KG/M2 | WEIGHT: 138 LBS

## 2022-04-01 DIAGNOSIS — Z34.90 PREGNANCY, UNSPECIFIED GESTATIONAL AGE: ICD-10-CM

## 2022-04-01 DIAGNOSIS — Z82.79 FAMILY HISTORY OF CONGENITAL HEART DISEASE IN SISTER: ICD-10-CM

## 2022-04-01 DIAGNOSIS — Z13.89 SCREENING FOR BLOOD OR PROTEIN IN URINE: Primary | ICD-10-CM

## 2022-04-01 DIAGNOSIS — Z67.91 RH NEGATIVE, ANTEPARTUM: ICD-10-CM

## 2022-04-01 DIAGNOSIS — O35.12X0 TRISOMY 18 OF FETUS IN CURRENT PREGNANCY, SINGLE OR UNSPECIFIED FETUS: ICD-10-CM

## 2022-04-01 DIAGNOSIS — O26.899 RH NEGATIVE, ANTEPARTUM: ICD-10-CM

## 2022-04-01 LAB
GLUCOSE UR STRIP-MCNC: NEGATIVE MG/DL
PROT UR STRIP-MCNC: NEGATIVE MG/DL

## 2022-04-01 PROCEDURE — 0502F SUBSEQUENT PRENATAL CARE: CPT | Performed by: OBSTETRICS & GYNECOLOGY

## 2022-04-01 NOTE — PROGRESS NOTES
The patient is here today for visit and ultrasound.  She is feeling good fetal movements.  She does have an occasional mild back pain.  Her  may be Rh-.  He is trying to get his lab records.    Ultrasound shows IUGR with baby with estimated fetal weight at the 12th percentile.  Normal circumference at the 3rd percentile.  Nonsymmetrical four-chamber view.  VSD is noted.  The right renal artery is not seen.  Head is low in the pelvis and appears more circular head measurements are small.  Weight gain for pregnancy is normal  Blood pressure 108/74 no protein.    Assessment-22 weeks  Trisomy 18.  IUGR and VSD are noted on ultrasound today.  Patient and her  would like to go to see Dr. Herman for fetal echocardiogram.  They do not want interventions but would like to know more information about severity and what to expect in the delivery room.  Fetal heart rate does sound regular  Patient is not experiencing any labor and feels fetal movements.  She will report any changes  Rh-- may be negative he will check  Cystic fibrosis carrier- was negative  Family history of congenital heart defect-patient can also discuss with Dr. Herman at that visit  Seizure disorder-patient has not had any seizure activity  Rubella nonimmune-immunization postdelivery  Follow-up with me in 3 weeks.

## 2022-04-12 ENCOUNTER — TRANSCRIBE ORDERS (OUTPATIENT)
Dept: ULTRASOUND IMAGING | Facility: HOSPITAL | Age: 27
End: 2022-04-12

## 2022-04-12 DIAGNOSIS — O35.12X0 TRISOMY 18 OF FETUS IN CURRENT PREGNANCY, SINGLE OR UNSPECIFIED FETUS: Primary | ICD-10-CM

## 2022-04-12 DIAGNOSIS — Q91.3 TRISOMY 18: Primary | ICD-10-CM

## 2022-04-12 DIAGNOSIS — Q21.0 VSD (VENTRICULAR SEPTAL DEFECT): ICD-10-CM

## 2022-04-25 ENCOUNTER — ROUTINE PRENATAL (OUTPATIENT)
Dept: OBSTETRICS AND GYNECOLOGY | Age: 27
End: 2022-04-25

## 2022-04-25 VITALS — SYSTOLIC BLOOD PRESSURE: 126 MMHG | BODY MASS INDEX: 24.2 KG/M2 | DIASTOLIC BLOOD PRESSURE: 70 MMHG | WEIGHT: 141 LBS

## 2022-04-25 DIAGNOSIS — Z82.79 FAMILY HISTORY OF CONGENITAL HEART DISEASE IN SISTER: ICD-10-CM

## 2022-04-25 DIAGNOSIS — O35.8XX0 MATERNAL CARE FOR OTHER (SUSPECTED) FETAL ABNORMALITY AND DAMAGE, NOT APPLICABLE OR UNSPECIFIED: ICD-10-CM

## 2022-04-25 DIAGNOSIS — Z67.91 RH NEGATIVE, ANTEPARTUM: Primary | ICD-10-CM

## 2022-04-25 DIAGNOSIS — O35.12X0 TRISOMY 18 OF FETUS IN CURRENT PREGNANCY, SINGLE OR UNSPECIFIED FETUS: ICD-10-CM

## 2022-04-25 DIAGNOSIS — O26.899 RH NEGATIVE, ANTEPARTUM: Primary | ICD-10-CM

## 2022-04-25 DIAGNOSIS — Z13.89 SCREENING FOR BLOOD OR PROTEIN IN URINE: ICD-10-CM

## 2022-04-25 DIAGNOSIS — O28.5 ABNORMAL GENETIC TEST DURING PREGNANCY: ICD-10-CM

## 2022-04-25 LAB
GLUCOSE UR STRIP-MCNC: NEGATIVE MG/DL
PROT UR STRIP-MCNC: NEGATIVE MG/DL

## 2022-04-25 PROCEDURE — 0502F SUBSEQUENT PRENATAL CARE: CPT | Performed by: OBSTETRICS & GYNECOLOGY

## 2022-04-25 NOTE — PROGRESS NOTES
Patient reports she is feeling well.  She is experiencing daily fetal movement.  Her  did find out he is Rh- and will bring documentation at next visit.  They are scheduled to see Dr. Herman next week.  No contractions.    Doppler heart tones are positive.  Fundal height is low at 23 cm  Blood pressure 126/70 with no protein  Weight gain for pregnancy is normal.    Assessment-25 weeks  Trisomy 18- IUGR and VSD have been seen on previous ultrasound.  Patient is scheduled to see Dr. Herman next week.  Parents desire comfort care at delivery.  Palliative care team visit has been done.  We discussed that the fetal care plan had mentioned induction of labor at 36 weeks.  I do not see a clear reason and the patient does not desire this.  I discussed we will evaluate her cervix if she has labor or at 36 weeks.    Rh-- Franc is Rh-.  He will bring in documentation at next visit.  Family history of congenital heart disease.  Patient's sister had VSD.  They will discuss with Dr. Herman next week  Seizure disorder-patient has not had any absence seizure's  Follow-up for third trimester labs in 3 weeks and ultrasound in 5 weeks.

## 2022-05-03 ENCOUNTER — HOSPITAL ENCOUNTER (OUTPATIENT)
Dept: ULTRASOUND IMAGING | Facility: HOSPITAL | Age: 27
Discharge: HOME | End: 2022-05-03
Admitting: OBSTETRICS & GYNECOLOGY

## 2022-05-03 DIAGNOSIS — Q91.3 TRISOMY 18: ICD-10-CM

## 2022-05-03 DIAGNOSIS — Q21.0 VSD (VENTRICULAR SEPTAL DEFECT): ICD-10-CM

## 2022-05-03 PROCEDURE — 93325 DOPPLER ECHO COLOR FLOW MAPG: CPT

## 2022-05-03 PROCEDURE — 76825 ECHO EXAM OF FETAL HEART: CPT

## 2022-05-03 PROCEDURE — 76827 ECHO EXAM OF FETAL HEART: CPT

## 2022-05-03 NOTE — PROGRESS NOTES
Fetal Cardiology Outpatient Consult  Note    Patient Name:Serina Graham  :1995  Medical Record Number:[id number]  Date of Service:5/3/2022  Requesting Obstetrician: Dr. Mark Gaspar  Primary Obstetrician: Same  Consult Location: [facility]    Reason for Visit:  Abnormal cardiac views on Obstetrical Ultrasound and Chromosomal abnormality in the fetus    History: I had the pleasure of seeing your patient, Serina Graham, today for a Fetal Cardiology Initial Consultation.  Fetal cardiology evaluation was requested due to Trisomy 18 in the fetus, and abnormal cardiac views.      Serina is a 26 y.o. woman who presents today at 26 weeks gestation, with a given due date of 8/3/2022.  This pregnancy was conceived naturally. Pregnancy has been complicated by Trisomy 18 on NIPT and amniocentesis, multiple congenital anomalies.    This is Serina's first pregnancy.  She has 0 living children and has had 0 previous miscarriages and 0 previous elective abortions.    OB History        1    Para   0    Term   0       0    AB   0    Living   0       SAB   0    IAB   0    Ectopic   0    Molar        Multiple   0    Live Births                    Past Medical History:  Past Medical History:   Diagnosis Date   • Asthma     no inhaler needed   • IBS (irritable bowel syndrome)    • Iron deficiency anemia    • Irregular menses    • Migraine    • Seizure, partial (HCC)     been 6-7 years since last seizure-was seeing neurology, was on Keppra (off now)   • Syncope, vasovagal      Current Medications:    Current Outpatient Medications:   •  Prenatal Vit w/Ap-Meulnkybw-XW (PNV PO), Take  by mouth., Disp: , Rfl:     Family History:  Family History   Problem Relation Age of Onset   • No Known Problems Mother    • No Known Problems Father    • No Known Problems Sister    • Breast cancer Maternal Grandmother 48        pt's mom is BRCA neg   • Lung cancer Maternal Grandmother     • Cervical cancer Maternal Aunt    • Colon cancer Paternal Uncle 45        he is jessica neg   • Diabetes Maternal Aunt    • Diabetes Maternal Uncle      There is a family history of Serina's sister had a VSD diagnosed as an infant, which was surgically repaired at age 3 yo to 5 yo with good results.  She is not 24 yo and doing well..    Imaging: A complete 2-D, color, and spectral doppler fetal echocardiogram was performed.  A full report is available separately.  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  1) Large membranous VSD.  2) Mild RVOT stenosis and mild pulmonary valve stenosis.  3) Aortic valve appears dysplastic, but no stenosis or regurgitation.  4) Ascending aorta is mildly dilated.  5) Ductus arteriosus has normal direction of flow from pulmonary artery to aorta.  6) Normal RV and LV size and systolic function.    A complete, detailed fetal echocardiogram can identify most major heart defects.  However, there are known limitations to this examination including a limited ability to diagnose some small atrial or ventricular septal defects, minor valve abnormalities, persistent patency of the ductus arteriosus, coarctation of the aorta, and abnormalities in small structures such as coronary arteries and pulmonary veins.    Discussion:   Findings were explained to patient and and her family.  The echo display screens in our examination room   were used.  Questions were answered at length and patient expressed understanding.  I explained the normal fetal circulation, today's fetal echocardiogram findings, the expected course of pregnancy, the impact of today's results on the location and mode of delivery and the expected  course.     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  2) Trisomy 18 in the fetus  3) 26 weeks gestation    Recommendations:    1. There is no evidence of a ductal dependent fetal cardiac lesion.  I do not anticipate the need for immediate initiation of prostaglandin therapy and pediatric cardiology evaluation after birth.  2. There is no fetal cardiac indication to delivery at a hospital which provides specialized pediatric cardiac care.  Serina is currently planning to delivery at [facility], which is appropriate from the standpoint of the fetal heart and circulation.  Serina has had a multidisciplinary meeting with the Palliative Care Team, and is planning for comfort care after birth, which I completely support.  Based on fetal echo findings, I expect that the baby will remain stable during pregnancy and tolerate labor and delivery well.  I expect that the baby will be hemodynamically stable during the  stay, and is not expected to have ductal dependent pulmonary or systemic circulation.  The natural history of this heart condition is depended on whether the pulmonary stenosis slowly becomes more narrow over time during infancy.  If this happens, the baby will likely slowly develop decreased oxygen saturations over weeks to months.  If the pulmonary stenosis remains mild, the baby will likely slowly develop pulmonary overcirculation heart failure and increased work of breathing and easy fatigue.  In either instance, I would expect symptoms to develop slowly over weeks to months.  I called Dr. Yao, NICU medical director and part of the Palliative Care Team by phone and discussed these findings to help guide palliative care management after delivery.   3. Presentation of clinical data at [facility] Fetal Care Conference planned.  4. I would be happy to see Serina again during pregnancy for any changes, problems, or concerns that may arise.  Please do not hesitate to call with any questions you may have.    Thank you for allowing me to share with you in the care of your patient.    I  personally scanned the patient and obtained images as part of today's visit.    I personally spent 60 minutes of direct provider time for the visit today, including review of prior OB and MFM medical records, face-to-face discussion and review of fetal cardiac images in the examination room, and charting.     Raymon Herman MD  [facility]  Pediatric Cardiology  [phone]    5/3/2022  15:10 EDT

## 2022-05-18 ENCOUNTER — ROUTINE PRENATAL (OUTPATIENT)
Dept: OBSTETRICS AND GYNECOLOGY | Age: 27
End: 2022-05-18

## 2022-05-18 VITALS — BODY MASS INDEX: 24.85 KG/M2 | SYSTOLIC BLOOD PRESSURE: 122 MMHG | WEIGHT: 144.8 LBS | DIASTOLIC BLOOD PRESSURE: 70 MMHG

## 2022-05-18 DIAGNOSIS — O35.12X0 TRISOMY 18 OF FETUS IN CURRENT PREGNANCY, SINGLE OR UNSPECIFIED FETUS: ICD-10-CM

## 2022-05-18 DIAGNOSIS — Z13.0 SCREENING FOR IRON DEFICIENCY ANEMIA: ICD-10-CM

## 2022-05-18 DIAGNOSIS — Z13.89 SCREENING FOR HEMATURIA OR PROTEINURIA: ICD-10-CM

## 2022-05-18 DIAGNOSIS — Z67.91 RH NEGATIVE, ANTEPARTUM: ICD-10-CM

## 2022-05-18 DIAGNOSIS — O26.899 RH NEGATIVE, ANTEPARTUM: ICD-10-CM

## 2022-05-18 DIAGNOSIS — Z13.1 SCREENING FOR DIABETES MELLITUS: Primary | ICD-10-CM

## 2022-05-18 LAB
GLUCOSE UR STRIP-MCNC: NEGATIVE MG/DL
PROT UR STRIP-MCNC: NEGATIVE MG/DL

## 2022-05-18 PROCEDURE — 0502F SUBSEQUENT PRENATAL CARE: CPT | Performed by: OBSTETRICS & GYNECOLOGY

## 2022-05-18 PROCEDURE — 90715 TDAP VACCINE 7 YRS/> IM: CPT | Performed by: OBSTETRICS & GYNECOLOGY

## 2022-05-18 PROCEDURE — 90471 IMMUNIZATION ADMIN: CPT | Performed by: OBSTETRICS & GYNECOLOGY

## 2022-05-18 NOTE — PROGRESS NOTES
Patient is feeling well.  She is feeling daily fetal movements.  She does have some allergies and plans to start antihistamine.  Her  brought in his blood type which is O- on his phone.  He will send it to the office managers email and we will print out a copy to the chart.  Because they are both Rh- she will not require RhoGAM.    Dr. Herman's scan is reviewed.  Baby has VSD and mild pulmonary stenosis.  Weight gain is normal  Blood pressure 122/70 with no protein  Fundal height is slightly low at 27.  Doppler heart tones are positive.  Baby is vertex by handheld ultrasound.    Assessment-29 weeks  Trisomy 18- IUGR and VSD have been noted.  Fetal echo shows mild pulmonary stenosis.  Dr. Herman feels like the baby will have slow decompensation over weeks to months.  I did discuss delivery with Dr. Noble and the patient and her .  They could consider induction of labor at about 36 weeks on July 10 this could be done due to increased risk of stillbirth with trisomy 18.  Parents would like to consider that.  Third trimester labs today  Rh-- is also Rh- as documented on his labs.  Patient will not require RhoGAM  History of seizure disorder-no seizure activity that the patient is noted.

## 2022-05-20 LAB
BLD GP AB SCN SERPL QL: NORMAL
BLD GP AB SCN SERPL QL: POSITIVE
BLOOD GROUP ANTIBODIES SERPL: ABNORMAL
ERYTHROCYTE [DISTWIDTH] IN BLOOD BY AUTOMATED COUNT: 12.9 % (ref 11.7–15.4)
GLUCOSE 1H P 50 G GLC PO SERPL-MCNC: 144 MG/DL (ref 65–139)
HCT VFR BLD AUTO: 41.3 % (ref 34–46.6)
HGB BLD-MCNC: 13.8 G/DL (ref 11.1–15.9)
MCH RBC QN AUTO: 30.7 PG (ref 26.6–33)
MCHC RBC AUTO-ENTMCNC: 33.4 G/DL (ref 31.5–35.7)
MCV RBC AUTO: 92 FL (ref 79–97)
PLATELET # BLD AUTO: 199 X10E3/UL (ref 150–450)
RBC # BLD AUTO: 4.5 X10E6/UL (ref 3.77–5.28)
WBC # BLD AUTO: 9.3 X10E3/UL (ref 3.4–10.8)
XXX BLOOD GROUP AB TITR SERPL AHG: ABNORMAL {TITER}

## 2022-05-25 ENCOUNTER — LAB (OUTPATIENT)
Dept: OBSTETRICS AND GYNECOLOGY | Age: 27
End: 2022-05-25

## 2022-05-25 DIAGNOSIS — R73.09 ELEVATED GLUCOSE TOLERANCE TEST: Primary | ICD-10-CM

## 2022-05-26 LAB
GLUCOSE 1H P 100 G GLC PO SERPL-MCNC: 104 MG/DL (ref 65–179)
GLUCOSE 2H P 100 G GLC PO SERPL-MCNC: 101 MG/DL (ref 65–154)
GLUCOSE 3H P 100 G GLC PO SERPL-MCNC: 77 MG/DL (ref 65–139)
GLUCOSE P FAST SERPL-MCNC: 82 MG/DL (ref 65–94)
Lab: NORMAL

## 2022-06-02 ENCOUNTER — ROUTINE PRENATAL (OUTPATIENT)
Dept: OBSTETRICS AND GYNECOLOGY | Age: 27
End: 2022-06-02

## 2022-06-02 VITALS — DIASTOLIC BLOOD PRESSURE: 74 MMHG | BODY MASS INDEX: 25.58 KG/M2 | WEIGHT: 149 LBS | SYSTOLIC BLOOD PRESSURE: 112 MMHG

## 2022-06-02 DIAGNOSIS — O28.5 ABNORMAL GENETIC TEST DURING PREGNANCY: ICD-10-CM

## 2022-06-02 DIAGNOSIS — O35.12X1 TRISOMY 18 OF FETUS IN CURRENT PREGNANCY, FETUS 1 OF MULTIPLE GESTATION: ICD-10-CM

## 2022-06-02 DIAGNOSIS — Z67.91 RH NEGATIVE, ANTEPARTUM: Primary | ICD-10-CM

## 2022-06-02 DIAGNOSIS — Z13.89 SCREENING FOR BLOOD OR PROTEIN IN URINE: ICD-10-CM

## 2022-06-02 DIAGNOSIS — O26.899 RH NEGATIVE, ANTEPARTUM: Primary | ICD-10-CM

## 2022-06-02 LAB
GLUCOSE UR STRIP-MCNC: NEGATIVE MG/DL
PROT UR STRIP-MCNC: NEGATIVE MG/DL

## 2022-06-02 PROCEDURE — 0502F SUBSEQUENT PRENATAL CARE: CPT | Performed by: OBSTETRICS & GYNECOLOGY

## 2022-06-02 NOTE — PROGRESS NOTES
Patient is feeling good movements.  She did have an elevated 1 hour but normal 3-hour.    Ultrasound today shows IUGR and new polyhydramnios.  MURIEL of 30  Estimated fetal weight of 2 pounds 10 ounces at the 13th percentile.  Abdominal circumference is less than the 1st percentile.  Head circumference and femur length are small  Baby is vertex  Blood pressure 112/74  Fundal height 29 cm  Elevated 1 hour but normal 3-hour  Antibody screen was positive due to RhoGAM for amniocentesis.    Assessment-31 weeks  Trisomy 18-IUGR and VSD.  Planning for induction of labor on July 10 due to increased risk of stillbirth prior to due date.  Polyhydramnios with MURIEL of 30 today.  I discussed with the patient that this can increase risk for contractions and labor.  We will follow-up weekly with MURIEL only  Rh-- is also Rh-.  This was documented.  She will not require RhoGAM  Labor warnings given

## 2022-06-08 ENCOUNTER — ANESTHESIA EVENT (OUTPATIENT)
Dept: EMERGENCY DEPT | Facility: HOSPITAL | Age: 27
End: 2022-06-08

## 2022-06-08 ENCOUNTER — ANESTHESIA (OUTPATIENT)
Dept: EMERGENCY DEPT | Facility: HOSPITAL | Age: 27
End: 2022-06-08

## 2022-06-08 ENCOUNTER — TELEPHONE (OUTPATIENT)
Dept: OBSTETRICS AND GYNECOLOGY | Age: 27
End: 2022-06-08

## 2022-06-08 ENCOUNTER — HOSPITAL ENCOUNTER (INPATIENT)
Facility: HOSPITAL | Age: 27
LOS: 1 days | Discharge: HOME | End: 2022-06-09
Attending: OBSTETRICS & GYNECOLOGY | Admitting: OBSTETRICS & GYNECOLOGY

## 2022-06-08 PROBLEM — Q91.3 TRISOMY 18: Status: ACTIVE | Noted: 2022-06-08

## 2022-06-08 LAB
ALBUMIN SERPL-MCNC: 3.9 G/DL (ref 3.5–5.2)
ALBUMIN/GLOB SERPL: 1.6 G/DL
ALP SERPL-CCNC: 88 U/L (ref 39–117)
ALT SERPL W P-5'-P-CCNC: 11 U/L (ref 1–33)
ANION GAP SERPL CALCULATED.3IONS-SCNC: 12 MMOL/L (ref 5–15)
AST SERPL-CCNC: 16 U/L (ref 1–32)
BACTERIA UR QL AUTO: ABNORMAL /HPF
BASOPHILS # BLD AUTO: 0.03 10*3/MM3 (ref 0–0.2)
BASOPHILS NFR BLD AUTO: 0.2 % (ref 0–1.5)
BILIRUB SERPL-MCNC: 0.4 MG/DL (ref 0–1.2)
BILIRUB UR QL STRIP: NEGATIVE
BUN SERPL-MCNC: 5 MG/DL (ref 6–20)
BUN/CREAT SERPL: 9.3 (ref 7–25)
CALCIUM SPEC-SCNC: 9.1 MG/DL (ref 8.6–10.5)
CHLORIDE SERPL-SCNC: 101 MMOL/L (ref 98–107)
CLARITY UR: ABNORMAL
CO2 SERPL-SCNC: 23 MMOL/L (ref 22–29)
COLOR UR: YELLOW
CREAT SERPL-MCNC: 0.54 MG/DL (ref 0.57–1)
DEPRECATED RDW RBC AUTO: 42.3 FL (ref 37–54)
EGFRCR SERPLBLD CKD-EPI 2021: 130.4 ML/MIN/1.73
EOSINOPHIL # BLD AUTO: 0.1 10*3/MM3 (ref 0–0.4)
EOSINOPHIL NFR BLD AUTO: 0.8 % (ref 0.3–6.2)
ERYTHROCYTE [DISTWIDTH] IN BLOOD BY AUTOMATED COUNT: 13 % (ref 12.3–15.4)
GLOBULIN UR ELPH-MCNC: 2.4 GM/DL
GLUCOSE SERPL-MCNC: 96 MG/DL (ref 65–99)
GLUCOSE UR STRIP-MCNC: NEGATIVE MG/DL
HCT VFR BLD AUTO: 38.5 % (ref 34–46.6)
HGB BLD-MCNC: 13 G/DL (ref 12–15.9)
HGB UR QL STRIP.AUTO: ABNORMAL
HYALINE CASTS UR QL AUTO: ABNORMAL /LPF
IMM GRANULOCYTES # BLD AUTO: 0.06 10*3/MM3 (ref 0–0.05)
IMM GRANULOCYTES NFR BLD AUTO: 0.5 % (ref 0–0.5)
KETONES UR QL STRIP: NEGATIVE
LEUKOCYTE ESTERASE UR QL STRIP.AUTO: ABNORMAL
LYMPHOCYTES # BLD AUTO: 1.44 10*3/MM3 (ref 0.7–3.1)
LYMPHOCYTES NFR BLD AUTO: 11.1 % (ref 19.6–45.3)
MCH RBC QN AUTO: 31 PG (ref 26.6–33)
MCHC RBC AUTO-ENTMCNC: 33.8 G/DL (ref 31.5–35.7)
MCV RBC AUTO: 91.9 FL (ref 79–97)
MONOCYTES # BLD AUTO: 1.03 10*3/MM3 (ref 0.1–0.9)
MONOCYTES NFR BLD AUTO: 7.9 % (ref 5–12)
NEUTROPHILS NFR BLD AUTO: 10.3 10*3/MM3 (ref 1.7–7)
NEUTROPHILS NFR BLD AUTO: 79.5 % (ref 42.7–76)
NITRITE UR QL STRIP: NEGATIVE
NRBC BLD AUTO-RTO: 0 /100 WBC (ref 0–0.2)
PH UR STRIP.AUTO: 7.5 [PH] (ref 5–8)
PLATELET # BLD AUTO: 196 10*3/MM3 (ref 140–450)
PMV BLD AUTO: 9.8 FL (ref 6–12)
POTASSIUM SERPL-SCNC: 4 MMOL/L (ref 3.5–5.2)
PROT SERPL-MCNC: 6.3 G/DL (ref 6–8.5)
PROT UR QL STRIP: NEGATIVE
RBC # BLD AUTO: 4.19 10*6/MM3 (ref 3.77–5.28)
RBC # UR STRIP: ABNORMAL /HPF
REF LAB TEST METHOD: ABNORMAL
SARS-COV-2 RNA PNL SPEC NAA+PROBE: NOT DETECTED
SODIUM SERPL-SCNC: 136 MMOL/L (ref 136–145)
SP GR UR STRIP: 1.01 (ref 1–1.03)
SQUAMOUS #/AREA URNS HPF: ABNORMAL /HPF
UROBILINOGEN UR QL STRIP: ABNORMAL
WBC # UR STRIP: ABNORMAL /HPF
WBC NRBC COR # BLD: 12.96 10*3/MM3 (ref 3.4–10.8)

## 2022-06-08 PROCEDURE — 87635 SARS-COV-2 COVID-19 AMP PRB: CPT | Performed by: OBSTETRICS & GYNECOLOGY

## 2022-06-08 PROCEDURE — 86850 RBC ANTIBODY SCREEN: CPT | Performed by: OBSTETRICS & GYNECOLOGY

## 2022-06-08 PROCEDURE — 25010000002 BUTORPHANOL PER 1 MG: Performed by: OBSTETRICS & GYNECOLOGY

## 2022-06-08 PROCEDURE — 86870 RBC ANTIBODY IDENTIFICATION: CPT | Performed by: OBSTETRICS & GYNECOLOGY

## 2022-06-08 PROCEDURE — C1755 CATHETER, INTRASPINAL: HCPCS | Performed by: ANESTHESIOLOGY

## 2022-06-08 PROCEDURE — 86901 BLOOD TYPING SEROLOGIC RH(D): CPT | Performed by: OBSTETRICS & GYNECOLOGY

## 2022-06-08 PROCEDURE — 80053 COMPREHEN METABOLIC PANEL: CPT | Performed by: OBSTETRICS & GYNECOLOGY

## 2022-06-08 PROCEDURE — 87086 URINE CULTURE/COLONY COUNT: CPT | Performed by: OBSTETRICS & GYNECOLOGY

## 2022-06-08 PROCEDURE — 99202 OFFICE O/P NEW SF 15 MIN: CPT | Performed by: OBSTETRICS & GYNECOLOGY

## 2022-06-08 PROCEDURE — 81001 URINALYSIS AUTO W/SCOPE: CPT | Performed by: OBSTETRICS & GYNECOLOGY

## 2022-06-08 PROCEDURE — 59025 FETAL NON-STRESS TEST: CPT

## 2022-06-08 PROCEDURE — 85025 COMPLETE CBC W/AUTO DIFF WBC: CPT | Performed by: OBSTETRICS & GYNECOLOGY

## 2022-06-08 PROCEDURE — 86900 BLOOD TYPING SEROLOGIC ABO: CPT | Performed by: OBSTETRICS & GYNECOLOGY

## 2022-06-08 PROCEDURE — C1755 CATHETER, INTRASPINAL: HCPCS

## 2022-06-08 RX ORDER — SODIUM CHLORIDE 0.9 % (FLUSH) 0.9 %
10 SYRINGE (ML) INJECTION AS NEEDED
Status: DISCONTINUED | OUTPATIENT
Start: 2022-06-08 | End: 2022-06-09 | Stop reason: HOSPADM

## 2022-06-08 RX ORDER — ONDANSETRON 4 MG/1
4 TABLET, FILM COATED ORAL EVERY 6 HOURS PRN
Status: DISCONTINUED | OUTPATIENT
Start: 2022-06-08 | End: 2022-06-09 | Stop reason: HOSPADM

## 2022-06-08 RX ORDER — DIPHENHYDRAMINE HYDROCHLORIDE 50 MG/ML
12.5 INJECTION INTRAMUSCULAR; INTRAVENOUS EVERY 8 HOURS PRN
Status: DISCONTINUED | OUTPATIENT
Start: 2022-06-08 | End: 2022-06-09 | Stop reason: HOSPADM

## 2022-06-08 RX ORDER — EPHEDRINE SULFATE 50 MG/ML
5 INJECTION, SOLUTION INTRAVENOUS AS NEEDED
Status: DISCONTINUED | OUTPATIENT
Start: 2022-06-08 | End: 2022-06-09 | Stop reason: HOSPADM

## 2022-06-08 RX ORDER — ONDANSETRON 2 MG/ML
4 INJECTION INTRAMUSCULAR; INTRAVENOUS ONCE AS NEEDED
Status: DISCONTINUED | OUTPATIENT
Start: 2022-06-08 | End: 2022-06-09 | Stop reason: HOSPADM

## 2022-06-08 RX ORDER — LIDOCAINE HYDROCHLORIDE AND EPINEPHRINE 15; 5 MG/ML; UG/ML
INJECTION, SOLUTION EPIDURAL AS NEEDED
Status: DISCONTINUED | OUTPATIENT
Start: 2022-06-08 | End: 2022-06-09 | Stop reason: SURG

## 2022-06-08 RX ORDER — FAMOTIDINE 10 MG/ML
20 INJECTION, SOLUTION INTRAVENOUS ONCE AS NEEDED
Status: DISCONTINUED | OUTPATIENT
Start: 2022-06-08 | End: 2022-06-09 | Stop reason: HOSPADM

## 2022-06-08 RX ORDER — SODIUM CHLORIDE, SODIUM LACTATE, POTASSIUM CHLORIDE, CALCIUM CHLORIDE 600; 310; 30; 20 MG/100ML; MG/100ML; MG/100ML; MG/100ML
125 INJECTION, SOLUTION INTRAVENOUS CONTINUOUS
Status: DISCONTINUED | OUTPATIENT
Start: 2022-06-08 | End: 2022-06-09 | Stop reason: HOSPADM

## 2022-06-08 RX ORDER — FENTANYL CIT 0.2 MG/100ML-ROPIV 0.2%-NACL 0.9% EPIDURAL INJ 2/0.2 MCG/ML-%
10 SOLUTION INJECTION CONTINUOUS
Status: DISCONTINUED | OUTPATIENT
Start: 2022-06-08 | End: 2022-06-09 | Stop reason: HOSPADM

## 2022-06-08 RX ORDER — BUTORPHANOL TARTRATE 1 MG/ML
1 INJECTION, SOLUTION INTRAMUSCULAR; INTRAVENOUS ONCE
Status: COMPLETED | OUTPATIENT
Start: 2022-06-08 | End: 2022-06-08

## 2022-06-08 RX ORDER — BUTORPHANOL TARTRATE 1 MG/ML
1 INJECTION, SOLUTION INTRAMUSCULAR; INTRAVENOUS ONCE
Status: DISCONTINUED | OUTPATIENT
Start: 2022-06-08 | End: 2022-06-09 | Stop reason: HOSPADM

## 2022-06-08 RX ORDER — SODIUM CHLORIDE, SODIUM LACTATE, POTASSIUM CHLORIDE, CALCIUM CHLORIDE 600; 310; 30; 20 MG/100ML; MG/100ML; MG/100ML; MG/100ML
999 INJECTION, SOLUTION INTRAVENOUS CONTINUOUS
Status: DISCONTINUED | OUTPATIENT
Start: 2022-06-08 | End: 2022-06-08

## 2022-06-08 RX ORDER — ONDANSETRON 2 MG/ML
4 INJECTION INTRAMUSCULAR; INTRAVENOUS EVERY 6 HOURS PRN
Status: DISCONTINUED | OUTPATIENT
Start: 2022-06-08 | End: 2022-06-09 | Stop reason: HOSPADM

## 2022-06-08 RX ORDER — LIDOCAINE HYDROCHLORIDE 10 MG/ML
5 INJECTION, SOLUTION EPIDURAL; INFILTRATION; INTRACAUDAL; PERINEURAL AS NEEDED
Status: DISCONTINUED | OUTPATIENT
Start: 2022-06-08 | End: 2022-06-09 | Stop reason: HOSPADM

## 2022-06-08 RX ORDER — MAGNESIUM CARB/ALUMINUM HYDROX 105-160MG
30 TABLET,CHEWABLE ORAL ONCE
Status: DISCONTINUED | OUTPATIENT
Start: 2022-06-08 | End: 2022-06-09 | Stop reason: HOSPADM

## 2022-06-08 RX ORDER — SODIUM CHLORIDE 0.9 % (FLUSH) 0.9 %
10 SYRINGE (ML) INJECTION EVERY 12 HOURS SCHEDULED
Status: DISCONTINUED | OUTPATIENT
Start: 2022-06-08 | End: 2022-06-09 | Stop reason: HOSPADM

## 2022-06-08 RX ORDER — SODIUM CHLORIDE, SODIUM LACTATE, POTASSIUM CHLORIDE, CALCIUM CHLORIDE 600; 310; 30; 20 MG/100ML; MG/100ML; MG/100ML; MG/100ML
INJECTION, SOLUTION INTRAVENOUS CONTINUOUS
Status: CANCELLED | OUTPATIENT
Start: 2022-06-08

## 2022-06-08 RX ADMIN — BUTORPHANOL TARTRATE 1 MG: 1 INJECTION, SOLUTION INTRAMUSCULAR; INTRAVENOUS at 20:29

## 2022-06-08 RX ADMIN — SODIUM CHLORIDE, POTASSIUM CHLORIDE, SODIUM LACTATE AND CALCIUM CHLORIDE 125 ML/HR: 600; 310; 30; 20 INJECTION, SOLUTION INTRAVENOUS at 23:20

## 2022-06-08 RX ADMIN — Medication 10 ML/HR: at 21:32

## 2022-06-08 RX ADMIN — SODIUM CHLORIDE, POTASSIUM CHLORIDE, SODIUM LACTATE AND CALCIUM CHLORIDE 999 ML/HR: 600; 310; 30; 20 INJECTION, SOLUTION INTRAVENOUS at 19:54

## 2022-06-08 RX ADMIN — LIDOCAINE HYDROCHLORIDE AND EPINEPHRINE 4 ML: 15; 5 INJECTION, SOLUTION EPIDURAL at 21:27

## 2022-06-08 RX ADMIN — SODIUM CHLORIDE, POTASSIUM CHLORIDE, SODIUM LACTATE AND CALCIUM CHLORIDE 1000 ML: 600; 310; 30; 20 INJECTION, SOLUTION INTRAVENOUS at 22:12

## 2022-06-08 NOTE — NURSING NOTE
Palmira SINGH RN at bedside pt bent over in pain leaving bathroom. Dr. Peters notified, orders to put pt back on EFM and will monitor for another hour and reasses

## 2022-06-08 NOTE — TELEPHONE ENCOUNTER
Hub staff attempted to follow warm transfer process and was unsuccessful     Caller: Serina Graham    Relationship to patient: Self    Best call back number: [phone]    Patient is needing: PT CALLED IN AS SHE IS EXPERIENCING SOME LIGHT CRAMPING IN HER LOWER PELVIC AREA AND WHEN SHE WENT TO THE BATHROOM SHE NOTICED SOME LIGHT PINK COLOR ON THE TOILET PAPER WHEN SHE WIPED. SHE IS WANTING TO KNOW IF THIS IS CAUSE FOR CONCERN WITH HER CURRENT ISSUES WITH HER FLUID LEVELS AND IF SHE NEEDS TO BE SEEN SOONER THAN TOMORROW. PT DID NOTE THAT SHE VOMITED AND FELT NAUSEOUS FOR ABOUT 20 MIN THIS MORNING AS WELL. PT IS AVAILABLE ANYTIME FOR A CALL BACK AND A DETAILED MESSAGE CAN BE LEFT IS SHE IS UNABLE TO ANSWER.

## 2022-06-08 NOTE — OBED NOTES
LORE Note [facility]        Patient Name: Serina Graham  YOB: 1995  MRN: [id number]  Admission Date: 2022  5:44 PM  Date of Service: 2022    Chief Complaint: Contractions (LORE: Patient feeling pressure/tightening since around 1500. Rating them a 5/10. +FM, -LF, some pink discharge when wiped)        Subjective     Serina Graham is a 26 y.o. female  at 32w0d with Estimated Date of Delivery: 8/3/22 who presents with the chief complaint listed above.  She sees Mark Gaspar MD for her prenatal care. Her pregnancy has been complicated by: Trisomy 18 current pregnancy, Rh- status.  Patient presents to OB ED with complaints of possible contractions feeling some pressure and tightening and a small amount of pink discharge.  She is feeling fetal movement.  And she denies rupture of membranes.  She did have some nausea and vomiting and diarrhea earlier this morning but that is resolved and she has been able to eat without difficulty.  She has had no further nausea vomiting or diarrhea                Objective   Patient Active Problem List    Diagnosis    • Maternal care for other (suspected) fetal abnormality and damage, not applicable or unspecified [O35.8XX0]    • Trisomy 18 of fetus in current pregnancy [O35.1XX0]    • Hereditary disease in family possibly affecting fetus [O35.2XX0]    • Abnormal genetic test during pregnancy [O28.5]    • Cystic fibrosis carrier [Z14.1]    • Rh negative status during pregnancy [O26.899, Z67.91]    • Rubella non-immune status, antepartum [O09.899, Z28.39]    • Pregnancy [Z34.90]    • Family history of congenital heart disease in sister [Z82.79]    • Migraine [G43.909]    • Seizure, partial (HCC) [R56.9]         OB History    Para Term  AB Living   1 0 0 0 0 0   SAB IAB Ectopic Molar Multiple Live Births   0 0 0 0 0 0      # Outcome Date GA Lbr Tung/2nd Weight Sex Delivery Anes PTL Lv   1 Current                 Past Medical History:   Diagnosis  Date   • Asthma     no inhaler needed   • IBS (irritable bowel syndrome)    • Iron deficiency anemia    • Irregular menses    • Migraine    • Seizure, partial (HCC)     been 6-7 years since last seizure-was seeing neurology, was on Keppra (off now)   • Syncope, vasovagal        Past Surgical History:   Procedure Laterality Date   • WISDOM TOOTH EXTRACTION         No current facility-administered medications on file prior to encounter.     Current Outpatient Medications on File Prior to Encounter   Medication Sig Dispense Refill   • Prenatal Vit w/Ls-Nqzlogdos-AP (PNV PO) Take  by mouth.         No Known Allergies    Family History   Problem Relation Age of Onset   • No Known Problems Mother    • No Known Problems Father    • No Known Problems Sister    • Breast cancer Maternal Grandmother 48        pt's mom is BRCA neg   • Lung cancer Maternal Grandmother    • Cervical cancer Maternal Aunt    • Colon cancer Paternal Uncle 45        he is jessica neg   • Diabetes Maternal Aunt    • Diabetes Maternal Uncle        Social History     Socioeconomic History   • Marital status:      Spouse name: noelle    Tobacco Use   • Smoking status: Never Smoker   • Smokeless tobacco: Never Used   Vaping Use   • Vaping Use: Never used   Substance and Sexual Activity   • Alcohol use: No   • Drug use: Never   • Sexual activity: Yes     Partners: Male           Review of Systems   Constitutional: Negative for chills, fatigue and fever.   HENT: Negative.    Eyes: Negative for photophobia and visual disturbance.   Respiratory: Negative for cough, chest tightness and shortness of breath.    Cardiovascular: Negative for chest pain and leg swelling.   Gastrointestinal: Negative for abdominal pain ( Intermittent mild abdominal cramping), diarrhea, nausea and vomiting.   Genitourinary: Negative for dysuria, flank pain, hematuria, pelvic pain, vaginal bleeding ( Minimal pink spotting) and vaginal discharge.   Musculoskeletal: Negative for  "back pain.   Neurological: Negative for dizziness, seizures, weakness and headaches.        PHYSICAL EXAM:      VITAL SIGNS:  Vitals:    06/08/22 1800 06/08/22 1803   BP: 113/75    BP Location: Right arm    Patient Position: Sitting    Pulse: 69    Resp: 16    Temp:  98.3 °F (36.8 °C)   TempSrc:  Oral   SpO2:  95%   Weight:  67.6 kg (149 lb)   Height:  165.1 cm (65\")        FHT'S:                   Baseline:  130 BPM  Variability:  Moderate = 6 - 25 BPM  Accelerations:  15 x 15 accelerations present     Decelerations:  absent  Contractions:   absent     Interpretation:   Reactive NST, CAT 1 tracing        PHYSICAL EXAM:    General: well developed; well nourished  no acute distress   Heart: Not performed.   Lungs: breathing is unlabored     Abdomen: soft, non-tender; no masses  no umbilical or inguinal hernias are present       Cervix: was examined by nurse  Cervical Dilation (cm): 0  Cervical Effacement: 0%  Cervical Consistency: soft  Cervical Position: anterior   Contractions: none        Extremities: peripheral pulses normal, no pedal edema, no clubbing or cyanosis      LABS AND TESTING ORDERED:  1. Uterine and fetal monitoring  2. Urinalysis  3. Observation for labor    LAB RESULTS:    Recent Results (from the past 24 hour(s))   Urinalysis With Culture If Indicated - Urine, Clean Catch    Collection Time: 06/08/22  6:17 PM    Specimen: Urine, Clean Catch   Result Value Ref Range    Color, UA Yellow Yellow, Straw    Appearance, UA Cloudy (A) Clear    pH, UA 7.5 5.0 - 8.0    Specific Gravity, UA 1.012 1.005 - 1.030    Glucose, UA Negative Negative    Ketones, UA Negative Negative    Bilirubin, UA Negative Negative    Blood, UA Moderate (2+) (A) Negative    Protein, UA Negative Negative    Leuk Esterase, UA Small (1+) (A) Negative    Nitrite, UA Negative Negative    Urobilinogen, UA 0.2 E.U./dL 0.2 - 1.0 E.U./dL   Urinalysis, Microscopic Only - Urine, Clean Catch    Collection Time: 06/08/22  6:17 PM    Specimen: " Urine, Clean Catch   Result Value Ref Range    RBC, UA 13-20 (A) None Seen, 0-2 /HPF    WBC, UA 31-50 (A) None Seen, 0-2 /HPF    Bacteria, UA None Seen None Seen /HPF    Squamous Epithelial Cells, UA 0-2 None Seen, 0-2 /HPF    Hyaline Casts, UA 0-2 None Seen /LPF    Methodology Automated Microscopy        Lab Results   Component Value Date    ABO B 2022    RH Negative 2022       No results found for: STREPGPB          Assessment & Plan   Serina Graham is a 26 y.o. female  at 32w0d fetus with trisomy 18 who presented with cramping and pink spotting.   She was observed for labor but did not contract and her cervix was noted to be Cervical Dilation (cm): 0 cm/ Cervical Effacement: 0% % effaced/ vertex at   station.  After period of observation we were ready to let the patient go home she got up and went to the bathroom and then started cramping severely.  She was placed back on the monitor noted to be jon painfully.  In view of this IV fluid bolus with lactated Ringer's was initiated.  I called Dr. Greta Kaur who is covering for Dr CHRISTINA Gaspar MD and discussed with her how aggressive we would be a in view of trisomy 18.  I recommended admission for observation and she agreed.  Dr. Kaur will assume care of the patient at this time and decide whether or not the patient will receive steroids or magnesium sulfate.  Fetal evaluation was reassuring with a reactive NST        Final Impression:  • Pregnancy at 32w0d with fetus diagnosed as Trisomy 18    •  contractions at 32 weeks  • Reactive NST, CAT 1 tracing, Reassuring fetal status  • UA with no bacteria or nitrites but 13-20 RBC, and 31-50 WBC not c/w UTI will not treat but await results of urine culture which has been sent  •   • Maternal vital signs were reviewed and were unremarkable   •                 Vitals:    22 1800 22 1803   BP: 113/75    BP Location: Right arm    Patient Position: Sitting    Pulse: 69   "  Resp: 16    Temp:  98.3 °F (36.8 °C)   TempSrc:  Oral   SpO2:  95%   Weight:  67.6 kg (149 lb)   Height:  165.1 cm (65\")          PLAN:  1. She will be admitted to observation  2. Dr. Kaur will assume care of the patient at this time and provide further orders and management.    I have spent 30 minutes including face to face time with the patient, greater than 50% in discussion of the diagnosis (counseling) and/or coordination of care.     Romeo Peters MD  6/8/2022  18:35 EDT  OB Hospitalist  Phone:    [phone]          "

## 2022-06-09 VITALS
WEIGHT: 149 LBS | DIASTOLIC BLOOD PRESSURE: 70 MMHG | HEIGHT: 65 IN | SYSTOLIC BLOOD PRESSURE: 113 MMHG | OXYGEN SATURATION: 99 % | BODY MASS INDEX: 24.83 KG/M2 | RESPIRATION RATE: 16 BRPM | HEART RATE: 84 BPM | TEMPERATURE: 97.7 F

## 2022-06-09 LAB
ABO GROUP BLD: NORMAL
BACTERIA SPEC AEROBE CULT: NO GROWTH
BLD GP AB SCN SERPL QL: POSITIVE
RESIDUAL RHIG DETECTED: NORMAL
RH BLD: NEGATIVE
T&S EXPIRATION DATE: NORMAL

## 2022-06-09 PROCEDURE — 25010000002 ONDANSETRON PER 1 MG: Performed by: OBSTETRICS & GYNECOLOGY

## 2022-06-09 PROCEDURE — 59400 OBSTETRICAL CARE: CPT | Performed by: OBSTETRICS & GYNECOLOGY

## 2022-06-09 PROCEDURE — 90707 MMR VACCINE SC: CPT | Performed by: OBSTETRICS & GYNECOLOGY

## 2022-06-09 PROCEDURE — 88307 TISSUE EXAM BY PATHOLOGIST: CPT

## 2022-06-09 PROCEDURE — 0KQM0ZZ REPAIR PERINEUM MUSCLE, OPEN APPROACH: ICD-10-PCS | Performed by: OBSTETRICS & GYNECOLOGY

## 2022-06-09 PROCEDURE — 25010000002 MEASLES, MUMPS & RUBELLA VAC RECONSTITUTED SOLUTION: Performed by: OBSTETRICS & GYNECOLOGY

## 2022-06-09 PROCEDURE — 90471 IMMUNIZATION ADMIN: CPT | Performed by: OBSTETRICS & GYNECOLOGY

## 2022-06-09 RX ORDER — PROMETHAZINE HYDROCHLORIDE 25 MG/1
12.5 TABLET ORAL EVERY 4 HOURS PRN
Status: DISCONTINUED | OUTPATIENT
Start: 2022-06-09 | End: 2022-06-09 | Stop reason: HOSPADM

## 2022-06-09 RX ORDER — OXYTOCIN/0.9 % SODIUM CHLORIDE 30/500 ML
250 PLASTIC BAG, INJECTION (ML) INTRAVENOUS CONTINUOUS PRN
Status: DISPENSED | OUTPATIENT
Start: 2022-06-09 | End: 2022-06-09

## 2022-06-09 RX ORDER — ONDANSETRON 2 MG/ML
4 INJECTION INTRAMUSCULAR; INTRAVENOUS EVERY 6 HOURS PRN
Status: DISCONTINUED | OUTPATIENT
Start: 2022-06-09 | End: 2022-06-09 | Stop reason: HOSPADM

## 2022-06-09 RX ORDER — CARBOPROST TROMETHAMINE 250 UG/ML
250 INJECTION, SOLUTION INTRAMUSCULAR
Status: DISCONTINUED | OUTPATIENT
Start: 2022-06-09 | End: 2022-06-09 | Stop reason: HOSPADM

## 2022-06-09 RX ORDER — IBUPROFEN 600 MG/1
600 TABLET ORAL EVERY 6 HOURS PRN
Status: DISCONTINUED | OUTPATIENT
Start: 2022-06-09 | End: 2022-06-09 | Stop reason: HOSPADM

## 2022-06-09 RX ORDER — SODIUM CHLORIDE 0.9 % (FLUSH) 0.9 %
1-10 SYRINGE (ML) INJECTION AS NEEDED
Status: DISCONTINUED | OUTPATIENT
Start: 2022-06-09 | End: 2022-06-09 | Stop reason: HOSPADM

## 2022-06-09 RX ORDER — HYDROCORTISONE 25 MG/G
1 CREAM TOPICAL AS NEEDED
Status: DISCONTINUED | OUTPATIENT
Start: 2022-06-09 | End: 2022-06-09 | Stop reason: HOSPADM

## 2022-06-09 RX ORDER — BISACODYL 10 MG
10 SUPPOSITORY, RECTAL RECTAL DAILY PRN
Status: DISCONTINUED | OUTPATIENT
Start: 2022-06-10 | End: 2022-06-09 | Stop reason: HOSPADM

## 2022-06-09 RX ORDER — LANOLIN
1 CREAM (ML) TOPICAL
Status: DISCONTINUED | OUTPATIENT
Start: 2022-06-09 | End: 2022-06-09 | Stop reason: HOSPADM

## 2022-06-09 RX ORDER — PRENATAL VIT/IRON FUM/FOLIC AC 27MG-0.8MG
1 TABLET ORAL DAILY
Status: DISCONTINUED | OUTPATIENT
Start: 2022-06-09 | End: 2022-06-09 | Stop reason: HOSPADM

## 2022-06-09 RX ORDER — MISOPROSTOL 200 UG/1
800 TABLET ORAL ONCE AS NEEDED
Status: DISCONTINUED | OUTPATIENT
Start: 2022-06-09 | End: 2022-06-09 | Stop reason: HOSPADM

## 2022-06-09 RX ORDER — OXYTOCIN/0.9 % SODIUM CHLORIDE 30/500 ML
999 PLASTIC BAG, INJECTION (ML) INTRAVENOUS ONCE
Status: COMPLETED | OUTPATIENT
Start: 2022-06-09 | End: 2022-06-09

## 2022-06-09 RX ORDER — ONDANSETRON 4 MG/1
4 TABLET, FILM COATED ORAL EVERY 8 HOURS PRN
Status: DISCONTINUED | OUTPATIENT
Start: 2022-06-09 | End: 2022-06-09 | Stop reason: HOSPADM

## 2022-06-09 RX ORDER — OXYTOCIN/0.9 % SODIUM CHLORIDE 30/500 ML
125 PLASTIC BAG, INJECTION (ML) INTRAVENOUS CONTINUOUS PRN
Status: COMPLETED | OUTPATIENT
Start: 2022-06-09 | End: 2022-06-09

## 2022-06-09 RX ORDER — IBUPROFEN 600 MG/1
600 TABLET ORAL EVERY 6 HOURS PRN
Qty: 18 TABLET | Refills: 0 | Status: SHIPPED | OUTPATIENT
Start: 2022-06-09 | End: 2022-07-26

## 2022-06-09 RX ORDER — OXYCODONE HYDROCHLORIDE AND ACETAMINOPHEN 5; 325 MG/1; MG/1
1 TABLET ORAL EVERY 4 HOURS PRN
Status: DISCONTINUED | OUTPATIENT
Start: 2022-06-09 | End: 2022-06-09 | Stop reason: HOSPADM

## 2022-06-09 RX ORDER — DOCUSATE SODIUM 100 MG/1
100 CAPSULE, LIQUID FILLED ORAL 2 TIMES DAILY
Status: DISCONTINUED | OUTPATIENT
Start: 2022-06-09 | End: 2022-06-09 | Stop reason: HOSPADM

## 2022-06-09 RX ORDER — METHYLERGONOVINE MALEATE 0.2 MG/ML
200 INJECTION INTRAVENOUS ONCE AS NEEDED
Status: DISCONTINUED | OUTPATIENT
Start: 2022-06-09 | End: 2022-06-09 | Stop reason: HOSPADM

## 2022-06-09 RX ORDER — DOCUSATE SODIUM 100 MG/1
100 CAPSULE, LIQUID FILLED ORAL 2 TIMES DAILY
Status: DISCONTINUED | OUTPATIENT
Start: 2022-06-09 | End: 2022-06-09

## 2022-06-09 RX ADMIN — Medication 999 ML/HR: at 03:16

## 2022-06-09 RX ADMIN — MEASLES, MUMPS, AND RUBELLA VIRUS VACCINE LIVE 0.5 ML: 1000; 12500; 1000 INJECTION, POWDER, LYOPHILIZED, FOR SUSPENSION SUBCUTANEOUS at 15:40

## 2022-06-09 RX ADMIN — Medication 125 ML/HR: at 04:28

## 2022-06-09 RX ADMIN — IBUPROFEN 600 MG: 600 TABLET, FILM COATED ORAL at 16:15

## 2022-06-09 RX ADMIN — IBUPROFEN 600 MG: 600 TABLET, FILM COATED ORAL at 11:15

## 2022-06-09 RX ADMIN — ONDANSETRON 4 MG: 2 INJECTION INTRAMUSCULAR; INTRAVENOUS at 02:48

## 2022-06-09 NOTE — DISCHARGE SUMMARY
Discharge on postpartum day three quarters                      Vaginal delivery of trisomy 18 infant at 32 weeks                      Demise several hours after delivery    Subjective:    Chief Complaint   Patient presents with   • Contractions     LORE: Patient feeling pressure/tightening since around 1500. Rating them a 5/10. +FM, -LF, some pink discharge when wiped         Vitals:   Wt Readings from Last 3 Encounters:   06/08/22 67.6 kg (149 lb)   06/02/22 67.6 kg (149 lb)   05/18/22 65.7 kg (144 lb 12.8 oz)     Temp Readings from Last 3 Encounters:   06/09/22 98.6 °F (37 °C) (Oral)   03/08/22 96.6 °F (35.9 °C) (Infrared)   03/08/22 98.2 °F (36.8 °C)     BP Readings from Last 3 Encounters:   06/09/22 118/63   06/02/22 112/74   05/18/22 122/70     Pulse Readings from Last 3 Encounters:   06/09/22 86   03/08/22 73   03/08/22 69   ROS:      ROS:Pulm: neg for soa  GI: neg for heavy bleeding              Musculoskel: neg for leg pain        LABS:  Lab Results (last 24 hours)     Procedure Component Value Units Date/Time    Urine Culture - Urine, Urine, Clean Catch [[id number]]  (Normal) Collected: 06/08/22 1817    Specimen: Urine, Clean Catch Updated: 06/09/22 1122     Urine Culture No growth    Comprehensive Metabolic Panel [[id number]]  (Abnormal) Collected: 06/08/22 1943    Specimen: Blood Updated: 06/08/22 2017     Glucose 96 mg/dL      BUN 5 mg/dL      Creatinine 0.54 mg/dL      Sodium 136 mmol/L      Potassium 4.0 mmol/L      Chloride 101 mmol/L      CO2 23.0 mmol/L      Calcium 9.1 mg/dL      Total Protein 6.3 g/dL      Albumin 3.90 g/dL      ALT (SGPT) 11 U/L      AST (SGOT) 16 U/L      Alkaline Phosphatase 88 U/L      Total Bilirubin 0.4 mg/dL      Globulin 2.4 gm/dL      A/G Ratio 1.6 g/dL      BUN/Creatinine Ratio 9.3     Anion Gap 12.0 mmol/L      eGFR 130.4 mL/min/1.73      Comment: National Kidney Foundation and American Society of Nephrology (ASN) Task Force recommended calculation  based on the Chronic Kidney Disease Epidemiology Collaboration (CKD-EPI) equation refit without adjustment for race.       Narrative:      GFR Normal >60  Chronic Kidney Disease <60  Kidney Failure <15      COVID PRE-OP / PRE-PROCEDURE SCREENING ORDER (NO ISOLATION) - Swab, Nasopharynx [[id number]]  (Normal) Collected: 06/08/22 1943    Specimen: Swab from Nasopharynx Updated: 06/08/22 2013    Narrative:      The following orders were created for panel order COVID PRE-OP / PRE-PROCEDURE SCREENING ORDER (NO ISOLATION) - Swab, Nasopharynx.  Procedure                               Abnormality         Status                     ---------                               -----------         ------                     COVID-19,[facility] IN-HOUSE...[[id number]]  Normal              Final result                 Please view results for these tests on the individual orders.    COVID-19,[facility] IN-HOUSE CEPHEID/DORINDA NP SWAB IN TRANSPORT MEDIA 8-12 HR TAT - Swab, Nasopharynx [[id number]]  (Normal) Collected: 06/08/22 1943    Specimen: Swab from Nasopharynx Updated: 06/08/22 2013     COVID19 Not Detected    Narrative:      Fact sheet for providers: https://www.fda.gov/media/576961/download    Fact sheet for patients: https://www.fda.gov/media/060631/download    Test performed by PCR.    CBC & Differential [[id number]]  (Abnormal) Collected: 06/08/22 1943    Specimen: Blood Updated: 06/08/22 1959    Narrative:      The following orders were created for panel order CBC & Differential.  Procedure                               Abnormality         Status                     ---------                               -----------         ------                     CBC Auto Differential[[id number]]        Abnormal            Final result                 Please view results for these tests on the individual orders.    CBC Auto Differential [[id number]]  (Abnormal) Collected: 06/08/22 1943    Specimen: Blood Updated: 06/08/22 1959     WBC 12.96 10*3/mm3       RBC 4.19 10*6/mm3      Hemoglobin 13.0 g/dL      Hematocrit 38.5 %      MCV 91.9 fL      MCH 31.0 pg      MCHC 33.8 g/dL      RDW 13.0 %      RDW-SD 42.3 fl      MPV 9.8 fL      Platelets 196 10*3/mm3      Neutrophil % 79.5 %      Lymphocyte % 11.1 %      Monocyte % 7.9 %      Eosinophil % 0.8 %      Basophil % 0.2 %      Immature Grans % 0.5 %      Neutrophils, Absolute 10.30 10*3/mm3      Lymphocytes, Absolute 1.44 10*3/mm3      Monocytes, Absolute 1.03 10*3/mm3      Eosinophils, Absolute 0.10 10*3/mm3      Basophils, Absolute 0.03 10*3/mm3      Immature Grans, Absolute 0.06 10*3/mm3      nRBC 0.0 /100 WBC     Urinalysis With Culture If Indicated - Urine, Clean Catch [[id number]]  (Abnormal) Collected: 06/08/22 1817    Specimen: Urine, Clean Catch Updated: 06/08/22 1832     Color, UA Yellow     Appearance, UA Cloudy     pH, UA 7.5     Specific Gravity, UA 1.012     Glucose, UA Negative     Ketones, UA Negative     Bilirubin, UA Negative     Blood, UA Moderate (2+)     Protein, UA Negative     Leuk Esterase, UA Small (1+)     Nitrite, UA Negative     Urobilinogen, UA 0.2 E.U./dL    Narrative:      In absence of clinical symptoms, the presence of pyuria, bacteria, and/or nitrites on the urinalysis result does not correlate with infection.    Urinalysis, Microscopic Only - Urine, Clean Catch [[id number]]  (Abnormal) Collected: 06/08/22 1817    Specimen: Urine, Clean Catch Updated: 06/08/22 1832     RBC, UA 13-20 /HPF      WBC, UA 31-50 /HPF      Bacteria, UA None Seen /HPF      Squamous Epithelial Cells, UA 0-2 /HPF      Hyaline Casts, UA 0-2 /LPF      Methodology Automated Microscopy          Exam:   Gen: NAD, cooperative, conversive  Abd: Soft, nondistended, fundus is firm below umbilicus, approp tender  Ext: Nontender bilaterally  Lochia normal    Active Problems:    Rh negative status during pregnancy  Overview:    Rubella non-immune status, antepartum  Overview:    Trisomy 18 of fetus in current  pregnancy  Overview:    Trisomy 18  Overview:    Vaginal delivery  Overview:  Resolved Problems:    * No resolved hospital problems. *          Assessment::   Serina Graham is a 26 y.o. female  s/p Vaginal, Spontaneous       Patient underwent rapid dilatation and vaginal delivery early this morning at 32 weeks and delivered an infant with trisomy 18.  Fetal demise occurred shortly thereafter.  Patient appears stable hemoglobin is 13 she is ambulating, tolerating regular food and voiding.  Both patient and  are Rh-.  After counseling they declined RhoGAM  An order for MMR due to patient's rubella status was given  Grief counseling, , pastoral care have all been involved  Patient and  are requesting discharge later this afternoon.  I have discussed potential benefits of staying through the night, but they have declined.  Patient sent home on prescription ibuprofen.  Declined any narcotics.  Discharge instructions and follow-up have been given.  Invitae chromosomes.  [facility]ial at [facility] is tentatively planned.  Patient and  as well as family are grieving appropriately at this time    estrellita Oh MD     2022 14:05 EDT

## 2022-06-09 NOTE — NURSING NOTE
2022  0830: Into see Pt and FOB. Grandparents and siblings in the room.  Paternal grandmother holding infant. Pt grieving appropriately at this time. Infant footprint and hand print molds completed. Locks of hair obtained per parent request. Infant dressed and swaddled in blanket provided by parents.  Interment plans decided prior to delivery per Palliative Care Conferences: [place] and [facility]. Pt declines Autopsy and INVITAE chromosomes due to Amniocentesis results.  [facility] will follow up with Pt and FOB after Dr Oh rounds and determines discharge status.

## 2022-06-09 NOTE — PLAN OF CARE
Problem: Adult Inpatient Plan of Care  Goal: Plan of Care Review  Outcome: Met  Flowsheets (Taken 6/9/2022 1737)  Plan of Care Reviewed With:   patient   spouse  Outcome Evaluation: Pt had good pain control postpartum and appropriate emotions. Family had adequate bonding time and encouraged to take as much time as needed. Pt. desired discharge and went home wiht care instructions.  Goal: Patient-Specific Goal (Individualized)  Outcome: Ongoing, Progressing  Flowsheets  Taken 6/9/2022 1737 by My Santiago, RN  Patient-Specific Goals (Include Timeframe): Pain control  Taken 6/9/2022 0542 by Damian Cox RN  Individualized Care Needs: quality time with Arie  Anxieties, Fears or Concerns: worried about delivery outcome  Goal: Absence of Hospital-Acquired Illness or Injury  Outcome: Ongoing, Progressing  Intervention: Identify and Manage Fall Risk  Description: Perform standard risk assessment on admission using a validated tool or comprehensive approach appropriate to the patient; reassess fall risk frequently, with change in status or transfer to another level of care.  Communicate fall injury risk to interprofessional healthcare team.  Determine need for increased observation, equipment and environmental modification, such as low bed, signage and supportive, nonskid footwear.  Adjust safety measures to individual developmental age, stage and identified risk factors.  Reinforce the importance of safety and physical activity with patient and family.  Perform regular intentional rounding to assess need for position change, pain assessment and personal needs, including assistance with toileting.  Recent Flowsheet Documentation  Taken 6/9/2022 0720 by My Santiago, RN  Safety Promotion/Fall Prevention: safety round/check completed  Goal: Optimal Comfort and Wellbeing  Outcome: Ongoing, Progressing  Intervention: Monitor Pain and Promote Comfort  Description: Assess pain level, treatment efficacy and  patient response at regular intervals using a consistent pain scale.  Consider the presence and impact of preexisting chronic pain.  Encourage patient and caregiver involvement in pain assessment, interventions and safety measures.  Recent Flowsheet Documentation  Taken 6/9/2022 1130 by My Santiago RN  Pain Management Interventions: see MAR  Goal: Readiness for Transition of Care  Outcome: Ongoing, Progressing   Goal Outcome Evaluation:  Plan of Care Reviewed With: patient, spouse           Outcome Evaluation: Pt had good pain control postpartum and appropriate emotions. Family had adequate bonding time and encouraged to take as much time as needed. Pt. desired discharge and went home wi care instructions.

## 2022-06-09 NOTE — PROGRESS NOTES
Confirmed vertex presentation with BSUS. Patient and  would like to wait as long as possible for amniotomy to give 's parents time to get closer.

## 2022-06-09 NOTE — NURSING NOTE
Deirdre notified of arrival to Labor and Delivery,   Palliative Plan of Care reviewed.  Will call NICU, and NICU will call APRN when ready for delivery.

## 2022-06-09 NOTE — PLAN OF CARE
Problem: Adult Inpatient Plan of Care  Goal: Plan of Care Review  Outcome: Ongoing, Progressing  Flowsheets (Taken 2022 0542)  Progress: improving  Plan of Care Reviewed With:   patient   spouse  Outcome Evaluation:  at 0304. pt has very scant bleeding and no reports of pain at this time. baby passed away at 0516. will continue to monitor  Goal: Patient-Specific Goal (Individualized)  Outcome: Ongoing, Progressing  Flowsheets (Taken 2022 0542)  Individualized Care Needs: quality time with Arie  Anxieties, Fears or Concerns: worried about delivery outcome  Goal: Absence of Hospital-Acquired Illness or Injury  Outcome: Ongoing, Progressing  Intervention: Identify and Manage Fall Risk  Recent Flowsheet Documentation  Taken 2022 0446 by Damian Cox RN  Safety Promotion/Fall Prevention: safety round/check completed  Taken 2022 by Damian Cox RN  Safety Promotion/Fall Prevention: safety round/check completed  Taken 2022 by Damian Cox RN  Safety Promotion/Fall Prevention: safety round/check completed  Goal: Optimal Comfort and Wellbeing  Outcome: Ongoing, Progressing  Intervention: Monitor Pain and Promote Comfort  Recent Flowsheet Documentation  Taken 2022 by Damian Cox RN  Pain Management Interventions: (awaiting epidural) other (see comments)  Intervention: Provide Person-Centered Care  Recent Flowsheet Documentation  Taken 2022 0516 by aDmian Cox RN  Trust Relationship/Rapport:   care explained   choices provided   emotional support provided   questions answered   questions encouraged  Taken 2022 0501 by Damian Cox RN  Trust Relationship/Rapport:   care explained   choices provided   emotional support provided   questions encouraged   questions answered  Taken 2022 0446 by Damian Cox RN  Trust Relationship/Rapport:   care explained   choices provided   emotional support provided   questions answered   questions encouraged  Taken  6/9/2022 0406 by Damian Cox RN  Trust Relationship/Rapport:   care explained   choices provided   emotional support provided   questions answered   questions encouraged  Taken 6/9/2022 0346 by Damian Cox RN  Trust Relationship/Rapport:   care explained   choices provided   emotional support provided   questions answered   questions encouraged  Taken 6/9/2022 0331 by Damian Cox RN  Trust Relationship/Rapport:   care explained   choices provided   emotional support provided   questions answered   questions encouraged  Taken 6/8/2022 2220 by Damian Cox RN  Trust Relationship/Rapport:   care explained   choices provided   emotional support provided   questions answered   questions encouraged  Taken 6/8/2022 2115 by Damian Cox RN  Trust Relationship/Rapport:   care explained   choices provided   emotional support provided   questions answered   questions encouraged  Goal: Readiness for Transition of Care  Outcome: Ongoing, Progressing  Intervention: Mutually Develop Transition Plan  Recent Flowsheet Documentation  Taken 6/8/2022 2209 by Damian Cox RN  Transportation Anticipated: family or friend will provide  Patient/Family Anticipated Services at Transition: none  Patient/Family Anticipates Transition to:   home   home with family  Taken 6/8/2022 2205 by Damian Cox RN  Equipment Currently Used at Home: none     Problem: Device-Related Complication Risk (Anesthesia/Analgesia, Neuraxial)  Goal: Safe Infusion Delivery Completion  Outcome: Ongoing, Progressing     Problem: Infection (Anesthesia/Analgesia, Neuraxial)  Goal: Absence of Infection Signs and Symptoms  Outcome: Ongoing, Progressing     Problem: Nausea and Vomiting (Anesthesia/Analgesia, Neuraxial)  Goal: Nausea and Vomiting Relief  Outcome: Ongoing, Progressing     Problem: Pain (Anesthesia/Analgesia, Neuraxial)  Goal: Effective Pain Control  Outcome: Ongoing, Progressing  Intervention: Prevent or Manage Pain  Recent Flowsheet  Documentation  Taken 2022 by Damian Cox RN  Pain Management Interventions: (awaiting epidural) other (see comments)     Problem: Sensorimotor Impairment (Anesthesia/Analgesia, Neuraxial)  Goal: Baseline Motor Function  Outcome: Ongoing, Progressing  Intervention: Optimize Sensorimotor Function  Recent Flowsheet Documentation  Taken 2022 044 by Damian Cox RN  Safety Promotion/Fall Prevention: safety round/check completed  Taken 2022 by Damian Cox RN  Safety Promotion/Fall Prevention: safety round/check completed  Taken 2022 by Damian Cox RN  Safety Promotion/Fall Prevention: safety round/check completed     Problem: Urinary Retention (Anesthesia/Analgesia, Neuraxial)  Goal: Effective Urinary Elimination  Outcome: Ongoing, Progressing     Problem:  Fall Injury Risk  Goal: Absence of Fall, Infant Drop and Related Injury  Outcome: Ongoing, Progressing  Intervention: Promote Injury-Free Environment  Recent Flowsheet Documentation  Taken 2022 044 by Damian Cox RN  Safety Promotion/Fall Prevention: safety round/check completed  Taken 2022 by Damian Cox RN  Safety Promotion/Fall Prevention: safety round/check completed  Taken 2022 by Damian Cox RN  Safety Promotion/Fall Prevention: safety round/check completed     Problem: Skin Injury Risk Increased  Goal: Skin Health and Integrity  Outcome: Ongoing, Progressing   Goal Outcome Evaluation:  Plan of Care Reviewed With: patient, spouse        Progress: improving  Outcome Evaluation:  at 0304. pt has very scant bleeding and no reports of pain at this time. baby passed away at 0516. will continue to monitor  Problem:  Labor  Goal: Delayed  Delivery  Outcome: Unable to Meet, Plan Revised

## 2022-06-09 NOTE — ANESTHESIA PREPROCEDURE EVALUATION
" Anesthesia Evaluation     Patient summary reviewed and Nursing notes reviewed                Airway   Mallampati: II  TM distance: >3 FB  Neck ROM: full  Dental      Pulmonary    (+) asthma,  Cardiovascular         Neuro/Psych  (+) seizures (partial 7 years since last, was on keppra but off now ), headaches, syncope (vasovagal syncope),    GI/Hepatic/Renal/Endo      Musculoskeletal     Abdominal    Substance History      OB/GYN    (+) Pregnant (G1 @ 32+0),         Other   blood dyscrasia anemia,     ROS/Med Hx Other: CF carrier  Trisomy 18 of fetus, incompatible with life                  Anesthesia Plan    ASA 2     epidural     (I have reviewed the patient's history with the patient and the chart, including all pertinent laboratory results and imaging. I have explained the risks of epidural anesthesia including but not limited to hypotension, PDPH, nerve injury, and risk of failure/replacement. Patient understands risks and agrees to proceed.    /75 (BP Location: Right arm, Patient Position: Sitting)   Pulse 69   Temp 36.8 °C (98.3 °F) (Oral)   Resp 16   Ht 165.1 cm (65\")   Wt 67.6 kg (149 lb)   LMP 10/27/2021   SpO2 95%   BMI 24.79 kg/m²   )    Anesthetic plan, risks, benefits, and alternatives have been provided, discussed and informed consent has been obtained with: patient.        CODE STATUS:       "

## 2022-06-09 NOTE — H&P
[facility]  Obstetric History and Physical    Chief Complaint   Patient presents with   • Contractions     LORE: Patient feeling pressure/tightening since around 1500. Rating them a 5/10. +FM, -LF, some pink discharge when wiped       Subjective     Patient is a 26 y.o. female  currently at 32w0d, who presented to triage with very painful contractions. She is now resting comfortably with an epidural.  Cervical check reveals she is complete with a BBOW.      Her prenatal care is complicated by  Rubella Non-Immune, abnormal prenatal genetic screening  NIPT, fetal anomalies  Trisomy 18 and abnormal fetal growth  IUGR.  Her previous obstetric/gynecological history is noted for is non-contributory.    The following portions of the patients history were reviewed and updated as appropriate: current medications, allergies, past medical history, past surgical history, past family history, past social history and problem list .       Prenatal Information:  Prenatal Results     POC Urine Glucose/Protein     Test Value Reference Range Date Time    Urine Glucose  Negative mg/dL Negative, 1000 mg/dL (3+) 22    Urine Protein  Negative mg/dL Negative 22 09          Initial Prenatal Labs     Test Value Reference Range Date Time    Hemoglobin  13.9 g/dL 11.1 - 15.9 22 1515    Hematocrit  40.5 % 34.0 - 46.6 22 1515    Platelets  245 x10E3/uL 150 - 450 22 1515    Rubella IgG  0.93 index Immune >0.99 22 1515    Hepatitis B SAg  Negative  Negative 22 1515    Hepatitis C Ab  0.1 s/co ratio 0.0 - 0.9 22 1515    RPR  Non Reactive  Non Reactive 22 1515    ABO  B   22 1515    Rh  Negative   22 1515    Antibody Screen  Negative  Negative 22 1515    HIV  Non Reactive  Non Reactive 22 1515    Urine Culture  Final report   22 1449    Gonorrhea  Negative  Negative 22 1523    Chlamydia  Negative  Negative 22 1523    TSH        HgB A1c                2nd and 3rd Trimester     Test Value Reference Range Date Time    Hemoglobin (repeated)  13.0 g/dL 12.0 - 15.9 06/08/22 1943       13.8 g/dL 11.1 - 15.9 05/18/22 1035    Hematocrit (repeated)  38.5 % 34.0 - 46.6 06/08/22 1943       41.3 % 34.0 - 46.6 05/18/22 1035    Platelets   196 10*3/mm3 140 - 450 06/08/22 1943       199 x10E3/uL 150 - 450 05/18/22 1035       245 x10E3/uL 150 - 450 01/06/22 1515    GCT  144 mg/dL 65 - 139 05/18/22 1035    Antibody Screen (repeated)  Positive  Negative 05/18/22 1035       See Final Results  Negative 05/18/22 1035    GTT Fasting  82 mg/dL 65 - 94 05/25/22 0832    GTT 1 Hr  104 mg/dL 65 - 179 05/25/22 0832    GTT 2 Hr  101 mg/dL 65 - 154 05/25/22 0832    GTT 3 Hr  77 mg/dL 65 - 139 05/25/22 0832    Group B Strep              Drug Screening     Test Value Reference Range Date Time    Amphetamine Screen        Barbiturate Screen        Benzodiazepine Screen        Methadone Screen        Phencyclidine Screen        Opiates Screen        THC Screen        Cocaine Screen        Propoxyphene Screen        Buprenorphine Screen        Methamphetamine Screen        Oxycodone Screen        Tricyclic Antidepressants Screen              Other (Risk screening)     Test Value Reference Range Date Time    Varicella IgG ^ Previously vaccinated   01/06/22     Parvovirus IgG        CMV IgG        Cystic Fibrosis        Hemoglobin electrophoresis        NIPT        MSAFP-4        AFP (for NTD only)              Legend    ^: Historical                      External Prenatal Results     Pregnancy Outside Results - Transcribed From Office Records - See Scanned Records For Details     Test Value Date Time    ABO  B  01/06/22 1515    Rh  Negative  01/06/22 1515    Antibody Screen  Positive  05/18/22 1035       See Final Results  05/18/22 1035       Negative  01/06/22 1515    Varicella IgG ^ Previously vaccinated  01/06/22     Rubella  0.93 index 01/06/22 1515    Hgb  13.0 g/dL 06/08/22 1943        13.8 g/dL 22 1035       13.9 g/dL 22 1515    Hct  38.5 % 22 1943       41.3 % 22 1035       40.5 % 22 1515    Glucose Fasting GTT  82 mg/dL 22 0832    Glucose Tolerance Test 1 hour  104 mg/dL 22 0832    Glucose Tolerance Test 3 hour  77 mg/dL 22 0832    Gonorrhea (discrete)  Negative  22 1523    Chlamydia (discrete)  Negative  22 1523    RPR  Non Reactive  22 1515    VDRL       Syphilis Antibody       HBsAg  Negative  22 1515    Herpes Simplex Virus PCR       Herpes Simplex VIrus Culture       HIV  Non Reactive  22 1515    Hep C RNA Quant PCR       Hep C Antibody  0.1 s/co ratio 22 1515    AFP       Group B Strep       GBS Susceptibility to Clindamycin       GBS Susceptibility to Erythromycin       Fetal Fibronectin       Genetic Testing, Maternal Blood             Drug Screening     Test Value Date Time    Urine Drug Screen       Amphetamine Screen       Barbiturate Screen       Benzodiazepine Screen       Methadone Screen       Phencyclidine Screen       Opiates Screen       THC Screen       Cocaine Screen       Propoxyphene Screen       Buprenorphine Screen       Methamphetamine Screen       Oxycodone Screen       Tricyclic Antidepressants Screen             Legend    ^: Historical                         Past OB History:     OB History    Para Term  AB Living   1 0 0 0 0 0   SAB IAB Ectopic Molar Multiple Live Births   0 0 0 0 0 0      # Outcome Date GA Lbr Tung/2nd Weight Sex Delivery Anes PTL Lv   1 Current                Past Medical History: Past Medical History:   Diagnosis Date   • Asthma     no inhaler needed   • IBS (irritable bowel syndrome)    • Iron deficiency anemia    • Irregular menses    • Migraine    • Seizure, partial (HCC)     been 6-7 years since last seizure-was seeing neurology, was on Keppra (off now)   • Syncope, vasovagal       Past Surgical History Past Surgical History:   Procedure  Laterality Date   • WISDOM TOOTH EXTRACTION        Family History: Family History   Problem Relation Age of Onset   • No Known Problems Mother    • No Known Problems Father    • No Known Problems Sister    • Breast cancer Maternal Grandmother 48        pt's mom is BRCA neg   • Lung cancer Maternal Grandmother    • Cervical cancer Maternal Aunt    • Colon cancer Paternal Uncle 45        he is jessica neg   • Diabetes Maternal Aunt    • Diabetes Maternal Uncle       Social History:  reports that she has never smoked. She has never used smokeless tobacco.   reports no history of alcohol use.   reports no history of drug use.        Review of Systems   Constitutional: Negative for chills, fatigue and fever.   Gastrointestinal: Negative for abdominal pain.   Genitourinary: Negative for pelvic pain, vaginal bleeding, vaginal discharge and vaginal pain.   All other systems reviewed and are negative.        Objective     Vital Signs Range for the last 24 hours  Temperature: Temp:  [98.3 °F (36.8 °C)] 98.3 °F (36.8 °C)   Temp Source: Temp src: Oral   BP: BP: (100-122)/(50-76) 109/71   Pulse: Heart Rate:  [69-89] 73   Respirations: Resp:  [16] 16   SPO2: SpO2:  [95 %-99 %] 99 %   O2 Amount (l/min):     O2 Devices     Weight: Weight:  [67.6 kg (149 lb)] 67.6 kg (149 lb)     Physical Examination: General appearance - alert, well appearing, and in no distress  Abdomen - gravid, soft, nontender, nondistended, no masses or organomegaly  Musculoskeletal - no joint tenderness, deformity or swelling  Extremities - peripheral pulses normal, no pedal edema, no clubbing or cyanosis  Skin - normal coloration and turgor, no rashes, no suspicious skin lesions noted    Presentation: Vertex    Cervix: Exam by:     Dilation: Cervical Dilation (cm): 10   Effacement: Cervical Effacement: 100%   Station:       Fetal Heart Rate Assessment   Method: Fetal HR Assessment Method: external   Beats/min: Fetal HR (beats/min): 130   Baseline: Fetal HR  Baseline: normal range   Variability: Fetal HR Variability: moderate (amplitude range 6 to 25 bpm)   Accels: Fetal HR Accelerations: greater than/equal to 15 bpm, lasting at least 15 seconds   Decels: Fetal HR Decelerations: absent   Tracing Category:       Uterine Assessment   Method: Method: palpation, external tocotransducer   Frequency (min): Contraction Frequency (Minutes): 1.5-2   Ctx Count in 10 min: Contractions in 10 Minutes: 4   Duration:     Intensity: Contraction Intensity: mild by palpation   Intensity by IUPC:     Resting Tone: Uterine Resting Tone: soft by palpation   Resting Tone by IUPC:     [place] Units:       Laboratory Results:   Results from last 7 days   Lab Units 22   WBC 10*3/mm3 12.96*   HEMOGLOBIN g/dL 13.0   HEMATOCRIT % 38.5   PLATELETS 10*3/mm3 196     Results from last 7 days   Lab Units 22   SODIUM mmol/L 136   POTASSIUM mmol/L 4.0   CHLORIDE mmol/L 101   CO2 mmol/L 23.0   BUN mg/dL 5*   CREATININE mg/dL 0.54*   CALCIUM mg/dL 9.1   BILIRUBIN mg/dL 0.4   ALK PHOS U/L 88   ALT (SGPT) U/L 11   AST (SGOT) U/L 16   GLUCOSE mg/dL 96         Assessment & Plan       Rh negative status during pregnancy    Rubella non-immune status, antepartum    Trisomy 18 of fetus in current pregnancy    Trisomy 18      Assessment & Plan    Assessment:  1.  Intrauterine pregnancy at 32w0d gestation with reactive, reassuring fetal status.    2.   labor  without ROM  3.  Obstetrical history significant for is non-contributory.  4.  GBS status: unknown   5. Fetus with trisomy 18    Plan:  1. Vaginal anticipated - comfort care for fetus   2. Plan of care has been reviewed with patient and   3.  Risks, benefits of treatment plan have been discussed.  4.  All questions have been answered.        Greta Kaur MD  2022  22:48 EDT

## 2022-06-09 NOTE — ANESTHESIA PROCEDURE NOTES
Labor Epidural      Patient reassessed immediately prior to procedure    Patient location during procedure: OB  Performed By  Anesthesiologist: Peri Farley MD  Preanesthetic Checklist  Completed: patient identified, IV checked, site marked, risks and benefits discussed, surgical consent, monitors and equipment checked, pre-op evaluation and timeout performed  Prep:  Pt Position:sitting  Sterile Tech:cap, gloves, gown, mask and sterile barrier  Prep:chlorhexidine gluconate and isopropyl alcohol  Monitoring:blood pressure monitoring, continuous pulse oximetry and EKG  Epidural Block Procedure:  Approach:midline  Guidance:landmark technique and palpation technique  Location:L4-L5  Needle Type:Tuohy  Needle Gauge:17 G  Loss of Resistance Medium: saline  Loss of Resistance: 4cm  Cath Depth at skin:8 cm  Paresthesia: none  Aspiration:negative  Test Dose:negative  Number of Attempts: 1  Post Assessment:  Dressing:occlusive dressing applied and secured with tape  Pt Tolerance:patient tolerated the procedure well with no apparent complications  Complications:no

## 2022-06-09 NOTE — L&D DELIVERY NOTE
[facility]  Vaginal Delivery Note   Review the Delivery Report for details.   Serina Graham  1995      Delivery     Delivery: Vaginal, Spontaneous     YOB: 2022    Time of Birth:  Gestational Age 3:04 AM   32w1d     Anesthesia: Epidural     Delivering clinician: Greta Kaur    Forceps?   No   Vacuum? No    Shoulder dystocia present: No        Delivery narrative:  Patient presented with regular uterine contractions and quickly progressed to complete but was still intact. She desired to delay amniotomy as family members were travelling in. SROM occurred at 0232 and patient was set up to push. She pushed with four contractions to deliver a NVFI.  Copious amounts of clear fluid preceded infant. Cord was cut by the father and infant placed on mother's chest.  Placenta delivered SI/3VC. Small second degree laceration was repaired.              Infant    Findings: female  infant     Infant observations: Weight: 1020 g (2 lb 4 oz)   Length: 15.5  in  Observations/Comments:  infant scale #      Apgars: 4  @ 1 minute /    2  @ 5 minutes   Infant Name: Arie      Placenta, Cord, and Fluid    Placenta delivered  Spontaneous  at   6/9/2022  3:12 AM     Cord: 3 vessels  present.   Nuchal Cord?  no   Cord blood obtained: No    Cord gases obtained:  No              Repair    Episiotomy: None     No    Lacerations: Yes  Laceration Information  Laceration Repaired?   Perineal: 2nd  Yes    Periurethral:       Labial:       Sulcus:       Vaginal:       Cervical:         Suture used for repair: 3-0 Vicryl   Estimated Blood Loss:               Quantitative Blood Loss: Quantitative Blood Loss (mL): 317 mL (06/09/22 0406)                Complications  none    Disposition  Mother to Remain in LD  in stable condition currently.  Baby to remains with mom  in stable condition currently.      Greta Kaur MD  06/09/22  06:40 EDT

## 2022-06-09 NOTE — NURSING NOTE
Patient and family all said their goodbyes and expressed being ready to go home. Mother was taken in a wheelchair to family car waiting for her. Dr. Gaspar came to see pt before discharged and encouraged a postpartum follow up visit within 6 weeks at the patient's discretion assuming no new concerns arise.

## 2022-06-09 NOTE — NURSING NOTE
Patient provided discharge instructions with postpartum care instructions and verbalizes understanding. Aware to return for changes or concerns. Encouraged to call tomorrow to make a postpartum care follow-up.

## 2022-06-09 NOTE — ANESTHESIA POSTPROCEDURE EVALUATION
Patient: Serina Graham    Procedure Summary     Date: 06/08/22 Room / Location:     Anesthesia Start: 2118 Anesthesia Stop: 06/09/22 0304    Procedure: LABOR ANALGESIA Diagnosis:     Scheduled Providers:  Provider: Peri Farley MD    Anesthesia Type: epidural ASA Status: 2          Anesthesia Type: epidural    Vitals  Vitals Value Taken Time   /109 06/09/22 0531   Temp 37 °C (98.6 °F) 06/09/22 0346   Pulse 147 06/09/22 0531   Resp 16 06/09/22 0600   SpO2 99 % 06/09/22 0325           Post Anesthesia Care and Evaluation    Patient location during evaluation: bedside  Patient participation: complete - patient participated  Level of consciousness: awake and alert  Pain management: adequate    Airway patency: patent  Anesthetic complications: No anesthetic complications  PONV Status: controlled  Cardiovascular status: acceptable  Respiratory status: acceptable  Hydration status: acceptable

## 2022-07-26 ENCOUNTER — POSTPARTUM VISIT (OUTPATIENT)
Dept: OBSTETRICS AND GYNECOLOGY | Age: 27
End: 2022-07-26

## 2022-07-26 VITALS
DIASTOLIC BLOOD PRESSURE: 78 MMHG | WEIGHT: 127 LBS | SYSTOLIC BLOOD PRESSURE: 120 MMHG | HEIGHT: 65 IN | BODY MASS INDEX: 21.16 KG/M2

## 2022-07-26 DIAGNOSIS — Z11.51 SPECIAL SCREENING EXAMINATION FOR HUMAN PAPILLOMAVIRUS (HPV): ICD-10-CM

## 2022-07-26 DIAGNOSIS — Z12.4 SCREENING FOR MALIGNANT NEOPLASM OF THE CERVIX: ICD-10-CM

## 2022-07-26 PROBLEM — O26.899 RH NEGATIVE STATUS DURING PREGNANCY: Status: RESOLVED | Noted: 2022-01-09 | Resolved: 2022-07-26

## 2022-07-26 PROBLEM — Z67.91 RH NEGATIVE STATUS DURING PREGNANCY: Status: RESOLVED | Noted: 2022-01-09 | Resolved: 2022-07-26

## 2022-07-26 PROBLEM — O28.5 ABNORMAL GENETIC TEST DURING PREGNANCY: Status: RESOLVED | Noted: 2022-01-24 | Resolved: 2022-07-26

## 2022-07-26 PROBLEM — Q91.3 TRISOMY 18: Status: RESOLVED | Noted: 2022-06-08 | Resolved: 2022-07-26

## 2022-07-26 PROBLEM — O35.12X0 TRISOMY 18 OF FETUS IN CURRENT PREGNANCY: Status: RESOLVED | Noted: 2022-01-25 | Resolved: 2022-07-26

## 2022-07-26 PROBLEM — O09.899 RUBELLA NON-IMMUNE STATUS, ANTEPARTUM: Status: RESOLVED | Noted: 2022-01-09 | Resolved: 2022-07-26

## 2022-07-26 PROBLEM — Z34.90 PREGNANCY: Status: RESOLVED | Noted: 2022-01-06 | Resolved: 2022-07-26

## 2022-07-26 PROBLEM — Z28.39 RUBELLA NON-IMMUNE STATUS, ANTEPARTUM: Status: RESOLVED | Noted: 2022-01-09 | Resolved: 2022-07-26

## 2022-07-26 PROCEDURE — 0503F POSTPARTUM CARE VISIT: CPT | Performed by: OBSTETRICS & GYNECOLOGY

## 2022-07-26 RX ORDER — NORETHINDRONE ACETATE AND ETHINYL ESTRADIOL 1MG-20(21)
1 KIT ORAL DAILY
Qty: 28 TABLET | Refills: 12 | Status: SHIPPED | OUTPATIENT
Start: 2022-07-26 | End: 2023-07-26

## 2022-07-26 NOTE — PROGRESS NOTES
"Subjective   Serina Graham is a 27 y.o. female who presents for a postpartum visit.  Patient unfortunately had baby with trisomy.  Her daughter passed away soon after birth.  Patient wanted to discuss when to try for pregnancy again.  She had stopped bleeding but is now started menses.  She is coping well and is here today with her .  Patient would like to restart oral contraceptive pills that she took in the past.     The following portions of the patient's history were reviewed and updated as appropriate: allergies, current medications,and problem list.   Placental pathology did show superficial implantation.    Review of Systems  Pertinent items are noted in HPI.    Objective   /78   Ht 165.1 cm (65\")   Wt 57.6 kg (127 lb)   LMP 10/27/2021   Breastfeeding No   BMI 21.13 kg/m²    General:  Alert and oriented, NAD    Breasts:         Heart:     Abdomen: Normal findings, nontender    Vulva: Normal, well-healed    Vagina: No lesions or abnormal discharge   Cervix:  Normal with no cervical motion tenderness   Corpus: Normal for post partum visit, uterus is retroverted   Adnexa:  Non tender, non enlarged         Assessment & Plan     Normal postpartum exam. Pap smear done at today's visit.  We discussed that trisomy.  Patient has seen the genetic counselor.  She does plan to try for pregnancy in about 6 to 12 months.  She will let me know if she has a positive pregnancy test.    1. Contraception: OCPS Junel  2. Slow return to normal activities reviewed. Continue prenatal vitamins.  3. Follow up in 12 months or sooner as needed.           "

## 2022-07-29 LAB
CYTOLOGIST CVX/VAG CYTO: NORMAL
CYTOLOGY CVX/VAG DOC CYTO: NORMAL
CYTOLOGY CVX/VAG DOC THIN PREP: NORMAL
DX ICD CODE: NORMAL
HIV 1 & 2 AB SER-IMP: NORMAL
HPV I/H RISK 4 DNA CVX QL PROBE+SIG AMP: NEGATIVE
OTHER STN SPEC: NORMAL
PATHOLOGIST CVX/VAG CYTO: NORMAL
RECOM F/U CVX/VAG CYTO: NORMAL
STAT OF ADQ CVX/VAG CYTO-IMP: NORMAL

## 2023-02-21 ENCOUNTER — OFFICE VISIT (OUTPATIENT)
Dept: OBSTETRICS AND GYNECOLOGY | Age: 28
End: 2023-02-21
Payer: COMMERCIAL

## 2023-02-21 VITALS
SYSTOLIC BLOOD PRESSURE: 110 MMHG | WEIGHT: 125 LBS | HEIGHT: 65 IN | DIASTOLIC BLOOD PRESSURE: 62 MMHG | BODY MASS INDEX: 20.83 KG/M2

## 2023-02-21 DIAGNOSIS — Z31.69 PRE-CONCEPTION COUNSELING: Primary | ICD-10-CM

## 2023-02-21 PROCEDURE — 99212 OFFICE O/P EST SF 10 MIN: CPT | Performed by: PHYSICIAN ASSISTANT

## 2023-02-21 NOTE — PROGRESS NOTES
"Subjective     Chief Complaint   Patient presents with   • Consult     discuss family planning, has been trying to conceive and is not getting pregnant did have a pregnancy last year that resulted in the baby passing away after delivery (trisomy 18).  Did not have trouble conceiving at that time.       Serina Graham is a 27 y.o.  whose LMP is Patient's last menstrual period was 2023 (approximate). presents to discuss conception    She had a baby last year that passed away shortly after delivery  Trisomy 18  Is appropriately grieving this loss and has gone to therapy    She and her  waited a little while prior to trying for pregnancy but have now begun trying  Started 2 months ago  Is having regular menses  No new medication or medical history  Denies excess alcohol use and both are non smokers    she and her  did have counseling together  They tried a few therapist but didn't really feel that it helped  They are doing ok together  No Additional Complaints Reported    The following portions of the patient's history were reviewed and updated as appropriate:vital signs, allergies, current medications, past family history, past medical history, past social history, past surgical history and problem list      Review of Systems   Genitourinary:to discuss pregnancy/conception     Objective      /62   Ht 165.1 cm (65\")   Wt 56.7 kg (125 lb)   LMP 2023 (Approximate)   BMI 20.80 kg/m²     Physical Exam    General:   alert, comfortable, no distress and tearful   Heart: Not performed today   Lungs: Not performed today.   Breast: Not performed today   Neck: Not performed today   Abdomen: Not performed today   CVA: Not performed today   Pelvis: Not performed today   Extremities: Not performed today   Neurologic: negative   Psychiatric: Normal affect, judgement, and mood       Lab Review   Labs: No data reviewed    Imaging   No data reviewed    Assessment & Plan     ASSESSMENT  1. " Pre-conception counseling          PLAN  1. Disc that it is still early in the conceptoin process and that no intervention is warranted at this time. She understands this and just wanted to be sure she was doing the right thing. She is on a PNV. Currently in her ovulation phase based on LMP. Would encourage SA qod during ovulatory phase. Call for any additional questions.     Follow up: SATHISH Quintana  2/21/2023

## 2023-05-10 ENCOUNTER — OFFICE VISIT (OUTPATIENT)
Dept: OBSTETRICS AND GYNECOLOGY | Age: 28
End: 2023-05-10
Payer: COMMERCIAL

## 2023-05-10 VITALS
HEIGHT: 65 IN | DIASTOLIC BLOOD PRESSURE: 60 MMHG | SYSTOLIC BLOOD PRESSURE: 112 MMHG | WEIGHT: 126 LBS | BODY MASS INDEX: 20.99 KG/M2

## 2023-05-10 DIAGNOSIS — Z01.419 WELL WOMAN EXAM WITH ROUTINE GYNECOLOGICAL EXAM: Primary | ICD-10-CM

## 2023-05-10 DIAGNOSIS — Z12.4 ENCOUNTER FOR PAPANICOLAOU SMEAR FOR CERVICAL CANCER SCREENING: ICD-10-CM

## 2023-05-10 NOTE — PROGRESS NOTES
Subjective     Chief Complaint   Patient presents with   • Gynecologic Exam     annual exam last pap 2022 unsatisfactory/neg, was seen in feb for pre-conception and wanted to discuss.  They have been trying for pregnancy.       History of Present Illness    Serina Graham is a 27 y.o.  who presents for annual exam.  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         Current contraception: none  History of abnormal Pap smear: unsatisfactory/neg  Received Gardasil immunization: yes  Perform regular self breast exam: yes - occl  Family history of uterine or ovarian cancer: no  Family History of colon cancer: no  Family history of breast cancer: no    Mammogram: not indicated.  Colonoscopy: not indicated.  DEXA: not indicated.    Exercise: very active  Calcium/Vitamin D: adequate intake    The following portions of the patient's history were reviewed and updated as appropriate: allergies, current medications, past family history, past medical history, past social history, past surgical history and problem list.    Review of Systems   All  "other systems reviewed and are negative.      Review of Systems   Constitutional: Negative for fatigue.   Respiratory: Negative for shortness of breath.    Gastrointestinal: Negative for abdominal pain.   Genitourinary: Negative for dysuria.   Neurological: Negative for headaches.   Psychiatric/Behavioral: Negative for dysphoric mood.         Objective   Physical Exam    /60   Ht 165.1 cm (65\")   Wt 57.2 kg (126 lb)   LMP 05/03/2023 (Exact Date)   BMI 20.97 kg/m²   General:   alert, comfortable and no distress   Heart: regular rate and rhythm   Lungs: clear to auscultation bilaterally   Breast: normal appearance, no masses or tenderness, Inspection negative, No nipple retraction or dimpling, No nipple discharge or bleeding, No axillary or supraclavicular adenopathy, Normal to palpation without dominant masses   Neck: no adenopathy and no carotid bruit   Abdomen: normal findings: soft, non-tender   CVA: Not performed today   Pelvis: External genitalia: normal general appearance  Vaginal: normal mucosa without prolapse or lesions  Cervix: normal appearance and thin prep PAP obtained  Adnexa: normal bimanual exam  Uterus: normal single, nontender   Extremities: Not performed today   Neurologic: negative   Psychiatric: Normal affect, judgement, and mood     Assessment & Plan   Diagnoses and all orders for this visit:    1. Well woman exam with routine gynecological exam (Primary)    2. Encounter for Papanicolaou smear for cervical cancer screening  -     IGP, Rfx Aptima HPV ASCU        All questions answered.  Breast self exam technique reviewed and patient encouraged to perform self-exam monthly.  Discussed healthy lifestyle modifications.  Recommended 30 minutes of aerobic exercise five times per week.  Discussed calcium needs to prevent osteoporosis.    Pap done d/t unsatisfactory pap in 2022  Disc conception, will plan SA for partner. Stress could have impacted sperm so best to start there  Can plan pelvic " u/s as well, schedule with Nusz to see if HSG would be warranted as well

## 2023-05-17 LAB
CONV .: NORMAL
CYTOLOGIST CVX/VAG CYTO: NORMAL
CYTOLOGY CVX/VAG DOC CYTO: NORMAL
CYTOLOGY CVX/VAG DOC THIN PREP: NORMAL
DX ICD CODE: NORMAL
HIV 1 & 2 AB SER-IMP: NORMAL
OTHER STN SPEC: NORMAL
STAT OF ADQ CVX/VAG CYTO-IMP: NORMAL

## 2023-06-12 ENCOUNTER — OFFICE VISIT (OUTPATIENT)
Dept: OBSTETRICS AND GYNECOLOGY | Age: 28
End: 2023-06-12
Payer: COMMERCIAL

## 2023-06-12 VITALS
BODY MASS INDEX: 20.33 KG/M2 | WEIGHT: 122 LBS | SYSTOLIC BLOOD PRESSURE: 112 MMHG | HEIGHT: 65 IN | DIASTOLIC BLOOD PRESSURE: 60 MMHG

## 2023-06-12 DIAGNOSIS — Z31.9 INFERTILITY MANAGEMENT: Primary | ICD-10-CM

## 2023-06-12 NOTE — PROGRESS NOTES
"  Chief complaint-fertility    History of present illness- Patient is a 27 y.o.  who is a significant history of having a baby with trisomy who  soon after delivery.  Patient has been trying for pregnancy for almost 1 year.  She did have positive ovulation predictor kits.  She has no pain with intercourse and no history of any abdominal surgery or abdominal infections.  She has regular cycles every 30 days with mild dysmenorrhea.  Her menses last about 6 days.    Patient is thin.  She exercises about 5 days a week for about 30 minutes running and doing the Peloton.   plans to have a semen analysis.        /60   Ht 165.1 cm (65\")   Wt 55.3 kg (122 lb)   LMP 2023   BMI 20.30 kg/m²   Physical Exam  Constitutional:       Comments: Thin  female.  Patient is appropriately tearful when discussing her last pregnancy.   Psychiatric:         Mood and Affect: Mood normal.         Thought Content: Thought content normal.         Judgment: Judgment normal.       Pelvic ultrasound shows diverted uterus that measures 8 cm in length.  Lining appears normal.  Right ovary appears periovulatory.  No free fluid in the pelvis.  Left ovary appears normal.    Diagnoses and all orders for this visit:    1. Infertility management (Primary)  -     CBC (No Diff)  -     TSH  -     Rubella Antibody, IgG  -     Antimullerian Hormone (AMH)  -     Ambulatory Referral to Infertility    We will check labs today  Semen analysis and referral to infertility  Encourage some weight gain.  Asked patient could consider IVF with preimplantation genetic testing.  She will return to see me in 2 months and we will call her with results.  "

## 2023-06-15 LAB
ERYTHROCYTE [DISTWIDTH] IN BLOOD BY AUTOMATED COUNT: 12.1 % (ref 11.7–15.4)
HCT VFR BLD AUTO: 43.6 % (ref 34–46.6)
HGB BLD-MCNC: 14.4 G/DL (ref 11.1–15.9)
MCH RBC QN AUTO: 30.5 PG (ref 26.6–33)
MCHC RBC AUTO-ENTMCNC: 33 G/DL (ref 31.5–35.7)
MCV RBC AUTO: 92 FL (ref 79–97)
MIS SERPL-MCNC: 2.91 NG/ML
PLATELET # BLD AUTO: 232 X10E3/UL (ref 150–450)
RBC # BLD AUTO: 4.72 X10E6/UL (ref 3.77–5.28)
RUBV IGG SERPL IA-ACNC: 1.46 INDEX
TSH SERPL DL<=0.005 MIU/L-ACNC: 1.53 UIU/ML (ref 0.45–4.5)
WBC # BLD AUTO: 6 X10E3/UL (ref 3.4–10.8)